# Patient Record
Sex: MALE | Race: WHITE | NOT HISPANIC OR LATINO | Employment: OTHER | URBAN - METROPOLITAN AREA
[De-identification: names, ages, dates, MRNs, and addresses within clinical notes are randomized per-mention and may not be internally consistent; named-entity substitution may affect disease eponyms.]

---

## 2020-12-14 ENCOUNTER — HOSPITAL ENCOUNTER (EMERGENCY)
Facility: HOSPITAL | Age: 70
Discharge: HOME | End: 2020-12-14
Attending: EMERGENCY MEDICINE
Payer: MEDICARE

## 2020-12-14 VITALS
HEIGHT: 63 IN | HEART RATE: 78 BPM | WEIGHT: 145 LBS | BODY MASS INDEX: 25.69 KG/M2 | SYSTOLIC BLOOD PRESSURE: 163 MMHG | TEMPERATURE: 99 F | DIASTOLIC BLOOD PRESSURE: 93 MMHG | RESPIRATION RATE: 16 BRPM | OXYGEN SATURATION: 98 %

## 2020-12-14 DIAGNOSIS — S76.012A STRAIN OF LEFT HIP, INITIAL ENCOUNTER: Primary | ICD-10-CM

## 2020-12-14 DIAGNOSIS — M25.552 LEFT HIP PAIN: ICD-10-CM

## 2020-12-14 PROCEDURE — 63600175 PHARM REV CODE 636 W HCPCS: Performed by: EMERGENCY MEDICINE

## 2020-12-14 PROCEDURE — 99284 EMERGENCY DEPT VISIT MOD MDM: CPT | Mod: 25

## 2020-12-14 PROCEDURE — 25000003 PHARM REV CODE 250: Performed by: EMERGENCY MEDICINE

## 2020-12-14 PROCEDURE — 96372 THER/PROPH/DIAG INJ SC/IM: CPT

## 2020-12-14 RX ORDER — OXYCODONE AND ACETAMINOPHEN 5; 325 MG/1; MG/1
1 TABLET ORAL EVERY 6 HOURS PRN
Qty: 12 TABLET | Refills: 0 | Status: SHIPPED | OUTPATIENT
Start: 2020-12-14 | End: 2020-12-17

## 2020-12-14 RX ORDER — OXYCODONE AND ACETAMINOPHEN 5; 325 MG/1; MG/1
1 TABLET ORAL
Status: COMPLETED | OUTPATIENT
Start: 2020-12-14 | End: 2020-12-14

## 2020-12-14 RX ORDER — KETOROLAC TROMETHAMINE 30 MG/ML
15 INJECTION, SOLUTION INTRAMUSCULAR; INTRAVENOUS
Status: COMPLETED | OUTPATIENT
Start: 2020-12-14 | End: 2020-12-14

## 2020-12-14 RX ADMIN — KETOROLAC TROMETHAMINE 15 MG: 30 INJECTION, SOLUTION INTRAMUSCULAR at 07:12

## 2020-12-14 RX ADMIN — OXYCODONE HYDROCHLORIDE AND ACETAMINOPHEN 1 TABLET: 5; 325 TABLET ORAL at 07:12

## 2020-12-15 NOTE — ED TRIAGE NOTES
Pt arrived to ER via POV w c/o L hip pain since this morning.Pt AAO X 4 states he has been riding the bicycle yesterday and was just fine until this morning.Pt denies falling or blunt trauma.

## 2020-12-15 NOTE — FIRST PROVIDER EVALUATION
Emergency Department TeleTriage Encounter Note      CHIEF COMPLAINT    Chief Complaint   Patient presents with    Hip Pain     Pt c/o left hip pain for about 1 day. Pt last time he felt normal when he was riding a bike and he woke up to pain of the left hip. Pt denies falling, or any other injuries.        VITAL SIGNS   Initial Vitals [12/14/20 1745]   BP Pulse Resp Temp SpO2   (!) 163/69 83 18 98.2 °F (36.8 °C) 97 %      MAP       --            ALLERGIES    Review of patient's allergies indicates:  No Known Allergies    PROVIDER TRIAGE NOTE  This is a teletriage evaluation of a 70 y.o. male presenting to the ED with c/o left hip pain today. He reports doing bicycle sprints recently, but denies injury or trauma to the hip. Initial orders will be placed and care will be transferred to an alternate provider when patient is roomed for a full evaluation. Any additional orders and the final disposition will be determined by that provider.         ORDERS  Labs Reviewed - No data to display    ED Orders (720h ago, onward)    Start Ordered     Status Ordering Provider    12/14/20 1821 12/14/20 1820  X-Ray Hip 2 View Left  1 time imaging      Ordered ABHILASH RUBIO            Virtual Visit Note: The provider triage portion of this emergency department evaluation and documentation was performed via [employer], a HIPAA-compliant telemedicine application, in concert with a tele-presenter in the room. A face to face patient evaluation with one of my colleagues will occur once the patient is placed in an emergency department room.      DISCLAIMER: This note was prepared with [employer] [place] voice recognition transcription software. Garbled syntax, mangled pronouns, and other bizarre constructions may be attributed to that software system.

## 2020-12-17 NOTE — ED PROVIDER NOTES
Encounter Date: 12/14/2020       History     Chief Complaint   Patient presents with    Hip Pain     Pt c/o left hip pain for about 1 day. Pt last time he felt normal when he was riding a bike and he woke up to pain of the left hip. Pt denies falling, or any other injuries.      70-year-old male with history of coronary disease on aspirin Plavix, hypertension, high cholesterol presents complaining of left hip pain.  Woke up with this pain yesterday.  States difficult to walk on.  The day before he was performing interval wind sprints on a bicycle.  Does not recall any specific trauma.  No fever.  No numbness or tingling or weakness to the leg.        Review of patient's allergies indicates:  No Known Allergies  Past Medical History:   Diagnosis Date    Acid reflux     Anticoagulant long-term use     PLAVIX and ASPIRIN    Coronary artery disease     had angiogram--pt refused STENT placement    Diverticula of intestine     Hematuria     Hypercholesteremia     Hypertension     Kidney stone      Past Surgical History:   Procedure Laterality Date    EXTRACORPOREAL SHOCK WAVE LITHOTRIPSY      x 4 episodes in past    EYE SURGERY      removal of kidney stones      with nephrostomy tube in place for awhile     No family history on file.  Social History     Tobacco Use    Smoking status: Current Every Day Smoker     Packs/day: 0.50     Years: 40.00     Pack years: 20.00    Smokeless tobacco: Never Used   Substance Use Topics    Alcohol use: No    Drug use: No     Review of Systems   Constitutional: Negative for fever.   HENT: Negative for sore throat.    Respiratory: Negative for shortness of breath.    Cardiovascular: Negative for chest pain.   Gastrointestinal: Negative for nausea.   Genitourinary: Negative for dysuria.   Musculoskeletal: Positive for arthralgias. Negative for back pain.   Skin: Negative for rash.   Neurological: Negative for weakness.   Hematological: Does not bruise/bleed easily.        Physical Exam     Initial Vitals [12/14/20 1745]   BP Pulse Resp Temp SpO2   (!) 163/69 83 18 98.2 °F (36.8 °C) 97 %      MAP       --         Physical Exam    Nursing note and vitals reviewed.  Constitutional: He appears well-developed and well-nourished.   HENT:   Head: Atraumatic.   Eyes: EOM are normal. Pupils are equal, round, and reactive to light.   Neck: Normal range of motion. Neck supple. No JVD present.   Cardiovascular: Normal rate, regular rhythm, normal heart sounds and intact distal pulses. Exam reveals no gallop and no friction rub.    No murmur heard.  Pulmonary/Chest: Breath sounds normal.   Abdominal: Soft. Bowel sounds are normal. There is no abdominal tenderness.   Musculoskeletal: Normal range of motion.   Lymphadenopathy:     He has no cervical adenopathy.   Neurological: He is alert and oriented to person, place, and time. He has normal strength.   Skin: Skin is warm and dry.   Psychiatric: He has a normal mood and affect. Thought content normal.         ED Course   Procedures  Labs Reviewed - No data to display       Imaging Results          X-Ray Hip 2 View Left (Final result)  Result time 12/14/20 19:23:04    Final result by Ros Worley MD (12/14/20 19:23:04)                 Impression:      No acute bony abnormality detected.  Degenerative changes.      Electronically signed by: Ros Worley  Date:    12/14/2020  Time:    19:23             Narrative:    EXAMINATION:  TWO VIEWS OF THE LEFT HIP    CLINICAL HISTORY:  Pain in left hip    TECHNIQUE:  AP and lateral view of the left hip    COMPARISON:  None.    FINDINGS:  Two views of the left hip demonstrate no acute fracture or dislocation.  Mild degenerative changes of both hips and the visualized lower lumbar spine are noted.  The bones are osteopenic.  If pain is out of proportion to the radiological findings, recommend CT or MRI for further delineation pleural                              Hip strain vs bursitis.                                   Clinical Impression:       ICD-10-CM ICD-9-CM   1. Strain of left hip, initial encounter  S76.012A 843.9   2. Left hip pain  M25.552 719.45                          ED Disposition Condition    Discharge Stable        ED Prescriptions     Medication Sig Dispense Start Date End Date Auth. Provider    oxyCODONE-acetaminophen (PERCOCET) 5-325 mg per tablet (Expires today) Take 1 tablet by mouth every 6 (six) hours as needed for Pain. 12 tablet 12/14/2020 12/17/2020 Dylon Castrejon MD        Follow-up Information     Follow up With Specialties Details Why Contact Info    Kobe Dougherty MD Orthopedic Surgery Schedule an appointment as soon as possible for a visit   [address]  [facility] [address]  [phone]      [facility]  As needed, If symptoms worsen, fever or any problems. [address] [address] [phone]                                       Dylon Castrejon MD  12/17/20 5196

## 2022-02-15 ENCOUNTER — OFFICE VISIT (OUTPATIENT)
Dept: FAMILY MEDICINE | Facility: CLINIC | Age: 72
End: 2022-02-15
Payer: MEDICARE

## 2022-02-15 VITALS
SYSTOLIC BLOOD PRESSURE: 129 MMHG | TEMPERATURE: 98 F | OXYGEN SATURATION: 97 % | BODY MASS INDEX: 24.8 KG/M2 | WEIGHT: 140 LBS | HEIGHT: 63 IN | RESPIRATION RATE: 16 BRPM | HEART RATE: 89 BPM | DIASTOLIC BLOOD PRESSURE: 75 MMHG

## 2022-02-15 DIAGNOSIS — I73.9 PVD (PERIPHERAL VASCULAR DISEASE): ICD-10-CM

## 2022-02-15 DIAGNOSIS — I10 HYPERTENSION, UNSPECIFIED TYPE: ICD-10-CM

## 2022-02-15 DIAGNOSIS — J44.9 CHRONIC OBSTRUCTIVE PULMONARY DISEASE, UNSPECIFIED COPD TYPE: ICD-10-CM

## 2022-02-15 DIAGNOSIS — E03.9 HYPOTHYROIDISM, UNSPECIFIED TYPE: ICD-10-CM

## 2022-02-15 DIAGNOSIS — N40.0 BPH WITHOUT URINARY OBSTRUCTION: ICD-10-CM

## 2022-02-15 DIAGNOSIS — I25.10 CORONARY ARTERY DISEASE, UNSPECIFIED VESSEL OR LESION TYPE, UNSPECIFIED WHETHER ANGINA PRESENT, UNSPECIFIED WHETHER NATIVE OR TRANSPLANTED HEART: ICD-10-CM

## 2022-02-15 DIAGNOSIS — C85.90 NON-HODGKIN'S LYMPHOMA, UNSPECIFIED BODY REGION, UNSPECIFIED NON-HODGKIN LYMPHOMA TYPE: Primary | ICD-10-CM

## 2022-02-15 PROCEDURE — 99204 PR OFFICE/OUTPT VISIT, NEW, LEVL IV, 45-59 MIN: ICD-10-PCS | Mod: S$PBB,,, | Performed by: FAMILY MEDICINE

## 2022-02-15 PROCEDURE — 99999 PR PBB SHADOW E&M-EST. PATIENT-LVL IV: ICD-10-PCS | Mod: PBBFAC,,, | Performed by: FAMILY MEDICINE

## 2022-02-15 PROCEDURE — 99999 PR PBB SHADOW E&M-EST. PATIENT-LVL IV: CPT | Mod: PBBFAC,,, | Performed by: FAMILY MEDICINE

## 2022-02-15 PROCEDURE — 99204 OFFICE O/P NEW MOD 45 MIN: CPT | Mod: S$PBB,,, | Performed by: FAMILY MEDICINE

## 2022-02-15 PROCEDURE — 99214 OFFICE O/P EST MOD 30 MIN: CPT | Mod: PBBFAC,PO | Performed by: FAMILY MEDICINE

## 2022-02-15 RX ORDER — FERROUS SULFATE 325(65) MG
250 TABLET ORAL
COMMUNITY
End: 2023-02-10

## 2022-02-15 RX ORDER — LANOLIN ALCOHOL/MO/W.PET/CERES
1 CREAM (GRAM) TOPICAL EVERY OTHER DAY
COMMUNITY
Start: 2021-06-02

## 2022-02-15 RX ORDER — LEVOTHYROXINE SODIUM 50 UG/1
50 TABLET ORAL
COMMUNITY
End: 2023-02-10

## 2022-02-15 NOTE — PROGRESS NOTES
Subjective:       Patient ID: Micheal Monroe is a 71 y.o. male.    Chief Complaint: Establish Care      HPI  71-year-old male presents to establish care.  Patient has a history of non-Hodgkin's lymphoma.  Was previously seen by Oncology but stopped going.  Was recommendation for chemo but patient declined.  Denies any symptoms at this time.  States he does not want to follow-up with Oncology.  States he has refills on his medications.      Review of Systems   Constitutional: Negative.    HENT: Negative.    Respiratory: Negative.    Cardiovascular: Negative.    Gastrointestinal: Negative.    Endocrine: Negative.    Genitourinary: Negative.    Musculoskeletal: Negative.    Neurological: Negative.    Psychiatric/Behavioral: Negative.           Past Medical History:   Diagnosis Date    Acid reflux     Anticoagulant long-term use     PLAVIX and ASPIRIN    Coronary artery disease     had angiogram--pt refused STENT placement    Diverticula of intestine     Hematuria     Hypercholesteremia     Hypertension     Kidney stone      Past Surgical History:   Procedure Laterality Date    EXTRACORPOREAL SHOCK WAVE LITHOTRIPSY      x 4 episodes in past    EYE SURGERY      removal of kidney stones      with nephrostomy tube in place for awhile     No family history on file.  Social History     Socioeconomic History    Marital status: Single   Tobacco Use    Smoking status: Current Every Day Smoker     Packs/day: 0.50     Years: 40.00     Pack years: 20.00    Smokeless tobacco: Never Used   Substance and Sexual Activity    Alcohol use: No    Drug use: No    Sexual activity: Yes       Current Outpatient Medications:     ascorbic acid, vitamin C, (VITAMIN C) 500 MG tablet, Take 500 mg by mouth once daily., Disp: , Rfl:     clopidogrel (PLAVIX) 75 mg tablet, Take 75 mg by mouth once. , Disp: , Rfl:     coenzyme Q10 100 mg capsule, Take 100 mg by mouth once daily. , Disp: , Rfl: 0    CRESTOR 20 mg tablet, Take 5 mg by  "mouth every evening., Disp: , Rfl:     cyanocobalamin (VITAMIN B-12) 1000 MCG tablet, Take 1 tablet by mouth every other day., Disp: , Rfl:     ferrous sulfate (FEOSOL) 325 mg (65 mg iron) Tab tablet, Take 250 mg by mouth., Disp: , Rfl:     fish oil-omega-3 fatty acids 300-1,000 mg capsule, Take 1 g by mouth 2 (two) times daily., Disp: , Rfl:     levothyroxine (SYNTHROID) 50 MCG tablet, Take 50 mcg by mouth before breakfast., Disp: , Rfl:     lisinopril 10 MG tablet, Take 10 mg by mouth every evening. , Disp: , Rfl:     ANUCORT-HC 25 mg suppository, Place 25 mg rectally daily as needed. , Disp: , Rfl:     aspirin 81 MG Chew, Take 81 mg by mouth every evening., Disp: , Rfl:     pantoprazole (PROTONIX) 40 MG tablet, Take 40 mg by mouth nightly. , Disp: , Rfl:     phenazopyridine (PYRIDIUM) 200 MG tablet, Take 1 tablet (200 mg total) by mouth 3 (three) times daily as needed for Pain (Burning). (Patient not taking: Reported on 2/15/2022), Disp: 21 tablet, Rfl: 0    tamsulosin (FLOMAX) 0.4 mg Cp24, Take 1 capsule (0.4 mg total) by mouth once daily. (Patient taking differently: Take 0.4 mg by mouth nightly. ), Disp: 30 capsule, Rfl: 11   Objective:      Vitals:    02/15/22 1437   BP: 129/75   BP Location: Left arm   Patient Position: Sitting   BP Method: Small (Manual)   Pulse: 89   Resp: 16   Temp: 98.2 °F (36.8 °C)   TempSrc: Oral   SpO2: 97%   Weight: 63.5 kg (140 lb)   Height: 5' 3" (1.6 m)       Physical Exam  Constitutional:       General: He is not in acute distress.  HENT:      Head: Normocephalic and atraumatic.   Eyes:      Conjunctiva/sclera: Conjunctivae normal.   Cardiovascular:      Rate and Rhythm: Normal rate and regular rhythm.      Heart sounds: Normal heart sounds. No murmur heard.    No friction rub. No gallop.   Pulmonary:      Effort: Pulmonary effort is normal.      Breath sounds: Normal breath sounds. No wheezing or rales.   Musculoskeletal:      Cervical back: Neck supple.   Skin:     " General: Skin is warm and dry.   Neurological:      Mental Status: He is alert and oriented to person, place, and time.   Psychiatric:         Behavior: Behavior normal.         Thought Content: Thought content normal.         Judgment: Judgment normal.            Assessment:       1. Non-Hodgkin's lymphoma, unspecified body region, unspecified non-Hodgkin lymphoma type    2. BPH without urinary obstruction    3. PVD (peripheral vascular disease)    4. Hypertension, unspecified type    5. Chronic obstructive pulmonary disease, unspecified COPD type    6. Coronary artery disease, unspecified vessel or lesion type, unspecified whether angina present, unspecified whether native or transplanted heart    7. Hypothyroidism, unspecified type        Plan:       Non-Hodgkin's lymphoma, unspecified body region, unspecified non-Hodgkin lymphoma type    BPH without urinary obstruction    PVD (peripheral vascular disease)    Hypertension, unspecified type    Chronic obstructive pulmonary disease, unspecified COPD type    Coronary artery disease, unspecified vessel or lesion type, unspecified whether angina present, unspecified whether native or transplanted heart    Hypothyroidism, unspecified type  -     TSH; Future; Expected date: 02/15/2022  -     T4, Free; Future; Expected date: 02/15/2022      Continue meds.  Patient again declined follow-up with Oncology for his lymphoma.  Patient strongly advise to follow up with Oncology. Declined palliative care as well patient [facility]  patient had elevated TSH previously will recheck.          Patient note was created using [employer].  Any errors in syntax or even information may not have been identified and edited on initial review prior to signing this note.

## 2022-03-07 ENCOUNTER — TELEPHONE (OUTPATIENT)
Dept: FAMILY MEDICINE | Facility: CLINIC | Age: 72
End: 2022-03-07
Payer: MEDICARE

## 2022-03-07 NOTE — TELEPHONE ENCOUNTER
----- Message from Gaurav Perez sent at 3/7/2022  3:53 PM CST -----  Regarding: Call Back  Who Called: pt         What is the reason for the call: patient is calling in regards to tingling and tightness in left arm and states want referral to see a heart doctor. Please contact to further assist.          Can patient be contacted on [employer]: No          Call back number: [phone]    
Pt states he is having tingling and tightness and heart and arm x 1 week.  Recommended going to ER. Pt states he will go to urgent care now.   
None needed

## 2022-03-24 ENCOUNTER — OFFICE VISIT (OUTPATIENT)
Dept: CARDIOLOGY | Facility: CLINIC | Age: 72
End: 2022-03-24
Payer: MEDICARE

## 2022-03-24 VITALS
DIASTOLIC BLOOD PRESSURE: 73 MMHG | OXYGEN SATURATION: 96 % | HEIGHT: 63 IN | WEIGHT: 142.19 LBS | SYSTOLIC BLOOD PRESSURE: 128 MMHG | BODY MASS INDEX: 25.2 KG/M2 | RESPIRATION RATE: 16 BRPM | HEART RATE: 90 BPM

## 2022-03-24 DIAGNOSIS — I25.119 CORONARY ARTERY DISEASE INVOLVING NATIVE CORONARY ARTERY OF NATIVE HEART WITH ANGINA PECTORIS: ICD-10-CM

## 2022-03-24 DIAGNOSIS — R07.89 CHEST PAIN, ATYPICAL: ICD-10-CM

## 2022-03-24 DIAGNOSIS — I10 PRIMARY HYPERTENSION: ICD-10-CM

## 2022-03-24 DIAGNOSIS — R06.09 DOE (DYSPNEA ON EXERTION): ICD-10-CM

## 2022-03-24 DIAGNOSIS — E78.00 PURE HYPERCHOLESTEROLEMIA: ICD-10-CM

## 2022-03-24 PROCEDURE — 93000 ELECTROCARDIOGRAM COMPLETE: CPT | Mod: S$GLB,,, | Performed by: INTERNAL MEDICINE

## 2022-03-24 PROCEDURE — 3008F BODY MASS INDEX DOCD: CPT | Mod: CPTII,S$GLB,, | Performed by: INTERNAL MEDICINE

## 2022-03-24 PROCEDURE — 93000 EKG 12-LEAD: ICD-10-PCS | Mod: S$GLB,,, | Performed by: INTERNAL MEDICINE

## 2022-03-24 PROCEDURE — 1159F PR MEDICATION LIST DOCUMENTED IN MEDICAL RECORD: ICD-10-PCS | Mod: CPTII,S$GLB,, | Performed by: INTERNAL MEDICINE

## 2022-03-24 PROCEDURE — 3008F PR BODY MASS INDEX (BMI) DOCUMENTED: ICD-10-PCS | Mod: CPTII,S$GLB,, | Performed by: INTERNAL MEDICINE

## 2022-03-24 PROCEDURE — 3288F FALL RISK ASSESSMENT DOCD: CPT | Mod: CPTII,S$GLB,, | Performed by: INTERNAL MEDICINE

## 2022-03-24 PROCEDURE — 1125F AMNT PAIN NOTED PAIN PRSNT: CPT | Mod: CPTII,S$GLB,, | Performed by: INTERNAL MEDICINE

## 2022-03-24 PROCEDURE — 1159F MED LIST DOCD IN RCRD: CPT | Mod: CPTII,S$GLB,, | Performed by: INTERNAL MEDICINE

## 2022-03-24 PROCEDURE — 99999 PR PBB SHADOW E&M-EST. PATIENT-LVL IV: ICD-10-PCS | Mod: PBBFAC,,, | Performed by: INTERNAL MEDICINE

## 2022-03-24 PROCEDURE — 99204 OFFICE O/P NEW MOD 45 MIN: CPT | Mod: S$GLB,,, | Performed by: INTERNAL MEDICINE

## 2022-03-24 PROCEDURE — 1101F PT FALLS ASSESS-DOCD LE1/YR: CPT | Mod: CPTII,S$GLB,, | Performed by: INTERNAL MEDICINE

## 2022-03-24 PROCEDURE — 3074F SYST BP LT 130 MM HG: CPT | Mod: CPTII,S$GLB,, | Performed by: INTERNAL MEDICINE

## 2022-03-24 PROCEDURE — 99999 PR PBB SHADOW E&M-EST. PATIENT-LVL IV: CPT | Mod: PBBFAC,,, | Performed by: INTERNAL MEDICINE

## 2022-03-24 PROCEDURE — 3078F DIAST BP <80 MM HG: CPT | Mod: CPTII,S$GLB,, | Performed by: INTERNAL MEDICINE

## 2022-03-24 PROCEDURE — 1125F PR PAIN SEVERITY QUANTIFIED, PAIN PRESENT: ICD-10-PCS | Mod: CPTII,S$GLB,, | Performed by: INTERNAL MEDICINE

## 2022-03-24 PROCEDURE — 3288F PR FALLS RISK ASSESSMENT DOCUMENTED: ICD-10-PCS | Mod: CPTII,S$GLB,, | Performed by: INTERNAL MEDICINE

## 2022-03-24 PROCEDURE — 1101F PR PT FALLS ASSESS DOC 0-1 FALLS W/OUT INJ PAST YR: ICD-10-PCS | Mod: CPTII,S$GLB,, | Performed by: INTERNAL MEDICINE

## 2022-03-24 PROCEDURE — 99204 PR OFFICE/OUTPT VISIT, NEW, LEVL IV, 45-59 MIN: ICD-10-PCS | Mod: S$GLB,,, | Performed by: INTERNAL MEDICINE

## 2022-03-24 PROCEDURE — 3074F PR MOST RECENT SYSTOLIC BLOOD PRESSURE < 130 MM HG: ICD-10-PCS | Mod: CPTII,S$GLB,, | Performed by: INTERNAL MEDICINE

## 2022-03-24 PROCEDURE — 3078F PR MOST RECENT DIASTOLIC BLOOD PRESSURE < 80 MM HG: ICD-10-PCS | Mod: CPTII,S$GLB,, | Performed by: INTERNAL MEDICINE

## 2022-03-24 NOTE — PROGRESS NOTES
Subjective:    Patient ID:  Micheal Monroe is a 71 y.o. male who presents for evaluation of No chief complaint on file.      HPI     [facility]AN [facility]    Essential hypertension  Comments:  Controlled on current regimen.    Hyperlipidemia, unspecified hyperlipidemia type  Comments:  Stable. Monitor lipid panel.    Chronic obstructive pulmonary disease, unspecified COPD type  Comments:  Continue treatment as per Pulmonary.    Coronary artery disease due to lipid rich plaque  Comments:  Stable. Follow up with Cardiology    Non-Hodgkin's lymphoma, unspecified body region, unspecified non-Hodgkin lymphoma type  Comments:  [facility]       3/24/22 Here for evaluation of CAD - reports remote [facility] years ago - was told he needed a stent but he declined. Reports some increased BROOKS. Last week developed severe chest tightness radiating to left arm while replacing a radiator in a car  Smokes 1/2 ppd. Gets hip pain from cancer met - per patient  EKG NSR - ok      Review of Systems   Constitutional: Negative for decreased appetite.   HENT: Negative for ear discharge.    Eyes: Negative for blurred vision.   Endocrine: Negative for polyphagia.   Skin: Negative for nail changes.   Genitourinary: Negative for bladder incontinence.   Neurological: Negative for aphonia.   Psychiatric/Behavioral: Negative for hallucinations.   Allergic/Immunologic: Negative for hives.        Objective:    Physical Exam  Constitutional:       Appearance: He is well-developed.   HENT:      Head: Normocephalic and atraumatic.   Eyes:      Conjunctiva/sclera: Conjunctivae normal.      Pupils: Pupils are equal, round, and reactive to light.   Cardiovascular:      Rate and Rhythm: Normal rate.      Pulses: Intact distal pulses.      Heart sounds: Normal heart sounds.   Pulmonary:      Effort: Pulmonary effort is normal.      Breath sounds: Normal breath sounds.   Abdominal:      General: Bowel sounds are  normal.      Palpations: Abdomen is soft.   Musculoskeletal:         General: Normal range of motion.      Cervical back: Normal range of motion and neck supple.   Skin:     General: Skin is warm and dry.   Neurological:      Mental Status: He is alert and oriented to person, place, and time.           Assessment:       1. Coronary artery disease involving native coronary artery of native heart with angina pectoris    2. Primary hypertension    3. Pure hypercholesterolemia    4. Chest pain, atypical    5. BROOKS (dyspnea on exertion)         Plan:       Echo and lexiscan myoview for CP and BROOKS - cannot walk treadmill due to hip issues  Continue Rx for HTN, HLD, CAD

## 2022-04-12 ENCOUNTER — HOSPITAL ENCOUNTER (OUTPATIENT)
Dept: CARDIOLOGY | Facility: HOSPITAL | Age: 72
Discharge: HOME | End: 2022-04-12
Attending: INTERNAL MEDICINE
Payer: MEDICARE

## 2022-04-12 ENCOUNTER — HOSPITAL ENCOUNTER (OUTPATIENT)
Dept: RADIOLOGY | Facility: HOSPITAL | Age: 72
Discharge: HOME | End: 2022-04-12
Attending: INTERNAL MEDICINE
Payer: MEDICARE

## 2022-04-12 DIAGNOSIS — I10 PRIMARY HYPERTENSION: ICD-10-CM

## 2022-04-12 DIAGNOSIS — R07.89 CHEST PAIN, ATYPICAL: ICD-10-CM

## 2022-04-12 DIAGNOSIS — R06.09 DOE (DYSPNEA ON EXERTION): ICD-10-CM

## 2022-04-12 DIAGNOSIS — I25.119 CORONARY ARTERY DISEASE INVOLVING NATIVE CORONARY ARTERY OF NATIVE HEART WITH ANGINA PECTORIS: ICD-10-CM

## 2022-04-12 DIAGNOSIS — E78.00 PURE HYPERCHOLESTEROLEMIA: ICD-10-CM

## 2022-04-12 LAB
AORTIC ROOT ANNULUS: 2.81 CM
AORTIC VALVE CUSP SEPERATION: 2.12 CM
AV INDEX (PROSTH): 0.97
AV MEAN GRADIENT: 3 MMHG
AV PEAK GRADIENT: 6 MMHG
AV VALVE AREA: 2.97 CM2
AV VELOCITY RATIO: 0.99
CV ECHO LV RWT: 0.7 CM
CV STRESS BASE HR: 64 BPM
DIASTOLIC BLOOD PRESSURE: 76 MMHG
DOP CALC AO PEAK VEL: 1.25 M/S
DOP CALC AO VTI: 25.64 CM
DOP CALC LVOT AREA: 3 CM2
DOP CALC LVOT DIAMETER: 1.97 CM
DOP CALC LVOT PEAK VEL: 1.24 M/S
DOP CALC LVOT STROKE VOLUME: 76.04 CM3
DOP CALCLVOT PEAK VEL VTI: 24.96 CM
E WAVE DECELERATION TIME: 229.86 MSEC
E/A RATIO: 0.98
E/E' RATIO: 10.12 M/S
ECHO LV POSTERIOR WALL: 1.18 CM (ref 0.6–1.1)
EJECTION FRACTION: 55 %
FRACTIONAL SHORTENING: 33 % (ref 28–44)
INTERVENTRICULAR SEPTUM: 1.12 CM (ref 0.6–1.1)
LA MAJOR: 3.38 CM
LA MINOR: 4.19 CM
LA WIDTH: 3.77 CM
LEFT ATRIUM SIZE: 3.03 CM
LEFT ATRIUM VOLUME: 36.33 CM3
LEFT INTERNAL DIMENSION IN SYSTOLE: 2.26 CM (ref 2.1–4)
LEFT VENTRICLE DIASTOLIC VOLUME: 46.37 ML
LEFT VENTRICLE SYSTOLIC VOLUME: 17.41 ML
LEFT VENTRICULAR INTERNAL DIMENSION IN DIASTOLE: 3.37 CM (ref 3.5–6)
LEFT VENTRICULAR MASS: 120.42 G
LV LATERAL E/E' RATIO: 8.6 M/S
LV SEPTAL E/E' RATIO: 12.29 M/S
MV A" WAVE DURATION": 7.27 MSEC
MV PEAK A VEL: 0.88 M/S
MV PEAK E VEL: 0.86 M/S
MV STENOSIS PRESSURE HALF TIME: 53.11 MS
MV VALVE AREA P 1/2 METHOD: 4.14 CM2
NUC STRESS DIASTOLIC VOLUME INDEX: 28
NUC STRESS EJECTION FRACTION: 78 %
NUC STRESS SYSTOLIC VOLUME INDEX: 6
OHS CV CPX 85 PERCENT MAX PREDICTED HEART RATE MALE: 127
OHS CV CPX MAX PREDICTED HEART RATE: 149
OHS CV CPX PATIENT IS FEMALE: 0
OHS CV CPX PATIENT IS MALE: 1
OHS CV CPX PEAK DIASTOLIC BLOOD PRESSURE: 66 MMHG
OHS CV CPX PEAK HEAR RATE: 88 BPM
OHS CV CPX PEAK RATE PRESSURE PRODUCT: NORMAL
OHS CV CPX PEAK SYSTOLIC BLOOD PRESSURE: 122 MMHG
OHS CV CPX PERCENT MAX PREDICTED HEART RATE ACHIEVED: 59
OHS CV CPX RATE PRESSURE PRODUCT PRESENTING: 9152
PISA MRMAX VEL: 0.03 M/S
PISA TR MAX VEL: 1.85 M/S
PULM VEIN S/D RATIO: 2
PV PEAK D VEL: 0.26 M/S
PV PEAK S VEL: 0.52 M/S
PV PEAK VELOCITY: 0.92 CM/S
RA MAJOR: 4.36 CM
RA PRESSURE: 3 MMHG
RA WIDTH: 3.17 CM
RIGHT VENTRICULAR END-DIASTOLIC DIMENSION: 3.25 CM
SINUS: 3.23 CM
SYSTOLIC BLOOD PRESSURE: 143 MMHG
TDI LATERAL: 0.1 M/S
TDI SEPTAL: 0.07 M/S
TDI: 0.09 M/S
TR MAX PG: 14 MMHG
TRICUSPID ANNULAR PLANE SYSTOLIC EXCURSION: 2.11 CM
TV REST PULMONARY ARTERY PRESSURE: 17 MMHG

## 2022-04-12 PROCEDURE — 93016 CV STRESS TEST SUPVJ ONLY: CPT | Mod: ,,, | Performed by: INTERNAL MEDICINE

## 2022-04-12 PROCEDURE — 93018 CV STRESS TEST I&R ONLY: CPT | Mod: ,,, | Performed by: INTERNAL MEDICINE

## 2022-04-12 PROCEDURE — 93016 STRESS TEST WITH MYOCARDIAL PERFUSION (CUPID ONLY): ICD-10-PCS | Mod: ,,, | Performed by: INTERNAL MEDICINE

## 2022-04-12 PROCEDURE — 93306 TTE W/DOPPLER COMPLETE: CPT

## 2022-04-12 PROCEDURE — 93306 ECHO (CUPID ONLY): ICD-10-PCS | Mod: 26,,, | Performed by: INTERNAL MEDICINE

## 2022-04-12 PROCEDURE — A9502 TC99M TETROFOSMIN: HCPCS

## 2022-04-12 PROCEDURE — 63600175 PHARM REV CODE 636 W HCPCS: Performed by: INTERNAL MEDICINE

## 2022-04-12 PROCEDURE — 93018 STRESS TEST WITH MYOCARDIAL PERFUSION (CUPID ONLY): ICD-10-PCS | Mod: ,,, | Performed by: INTERNAL MEDICINE

## 2022-04-12 PROCEDURE — 78452 STRESS TEST WITH MYOCARDIAL PERFUSION (CUPID ONLY): ICD-10-PCS | Mod: 26,,, | Performed by: INTERNAL MEDICINE

## 2022-04-12 PROCEDURE — 93017 CV STRESS TEST TRACING ONLY: CPT

## 2022-04-12 PROCEDURE — 93306 TTE W/DOPPLER COMPLETE: CPT | Mod: 26,,, | Performed by: INTERNAL MEDICINE

## 2022-04-12 PROCEDURE — 78452 HT MUSCLE IMAGE SPECT MULT: CPT | Mod: 26,,, | Performed by: INTERNAL MEDICINE

## 2022-04-12 RX ORDER — REGADENOSON 0.08 MG/ML
0.4 INJECTION, SOLUTION INTRAVENOUS ONCE
Status: COMPLETED | OUTPATIENT
Start: 2022-04-12 | End: 2022-04-12

## 2022-04-12 RX ADMIN — REGADENOSON 0.4 MG: 0.08 INJECTION, SOLUTION INTRAVENOUS at 09:04

## 2022-05-20 ENCOUNTER — TELEPHONE (OUTPATIENT)
Dept: FAMILY MEDICINE | Facility: CLINIC | Age: 72
End: 2022-05-20
Payer: MEDICARE

## 2022-05-20 NOTE — TELEPHONE ENCOUNTER
----- Message from Ghada Diaz sent at 5/20/2022 12:44 PM CDT -----  Regarding: self [phone]  Type: Patient Call Back    Who called: self    What is the request in detail: Patient needs to have hip surgery and will come drop off paper work and asked for a call back.   Can the clinic reply by [facility]? no    Would the patient rather a call back or a response via [facility]?  Call back    Best call back number:[phone]

## 2022-05-24 ENCOUNTER — TELEPHONE (OUTPATIENT)
Dept: CARDIOLOGY | Facility: CLINIC | Age: 72
End: 2022-05-24
Payer: MEDICARE

## 2022-05-24 NOTE — TELEPHONE ENCOUNTER
Pt requests I call [facility] to get them to fax over a surgery clearance form. I reported that I will do so.     I also scheduled pt for an appt with Dr. Yeung to discuss the results of his test from last month as per his request.    ----- Message from Amelia Rosado sent at 5/23/2022 10:52 AM CDT -----  Type: Patient Call Back    Who called: self     What is the request in detail: Patient would like to speak with a nurse regarding clearance paperwork he needs signed for hip surgery, and to discuss his recent test results. Unable to schedule anything at this time.      Can the clinic reply by [facility]? No     Would the patient rather a call back or a response via [facility]? Call back     Best call back number: [phone]

## 2022-05-30 ENCOUNTER — OFFICE VISIT (OUTPATIENT)
Dept: FAMILY MEDICINE | Facility: CLINIC | Age: 72
End: 2022-05-30
Payer: MEDICARE

## 2022-05-30 ENCOUNTER — LAB VISIT (OUTPATIENT)
Dept: LAB | Facility: HOSPITAL | Age: 72
End: 2022-05-30
Attending: NURSE PRACTITIONER
Payer: MEDICARE

## 2022-05-30 VITALS
BODY MASS INDEX: 24.76 KG/M2 | HEART RATE: 108 BPM | DIASTOLIC BLOOD PRESSURE: 76 MMHG | WEIGHT: 139.75 LBS | RESPIRATION RATE: 18 BRPM | SYSTOLIC BLOOD PRESSURE: 128 MMHG | HEIGHT: 63 IN | TEMPERATURE: 99 F | OXYGEN SATURATION: 95 %

## 2022-05-30 DIAGNOSIS — I25.119 CORONARY ARTERY DISEASE INVOLVING NATIVE CORONARY ARTERY OF NATIVE HEART WITH ANGINA PECTORIS: ICD-10-CM

## 2022-05-30 DIAGNOSIS — R40.2410 GLASGOW COMA SCALE TOTAL SCORE 13-15, UNSPECIFIED COMA TIMING: ICD-10-CM

## 2022-05-30 DIAGNOSIS — Z01.818 PRE-OP EVALUATION: Primary | ICD-10-CM

## 2022-05-30 DIAGNOSIS — Z01.818 PRE-OP EVALUATION: ICD-10-CM

## 2022-05-30 DIAGNOSIS — C85.90 LYMPHOMA, UNSPECIFIED BODY REGION, UNSPECIFIED LYMPHOMA TYPE: ICD-10-CM

## 2022-05-30 DIAGNOSIS — E03.9 HYPOTHYROIDISM, UNSPECIFIED TYPE: ICD-10-CM

## 2022-05-30 LAB
BILIRUB UR QL STRIP: NEGATIVE
CLARITY UR: CLEAR
COLOR UR: YELLOW
GLUCOSE UR QL STRIP: NEGATIVE
HGB UR QL STRIP: NEGATIVE
KETONES UR QL STRIP: NEGATIVE
LEUKOCYTE ESTERASE UR QL STRIP: NEGATIVE
NITRITE UR QL STRIP: NEGATIVE
PH UR STRIP: 5 [PH] (ref 5–8)
PROT UR QL STRIP: ABNORMAL
SP GR UR STRIP: 1.02 (ref 1–1.03)
URN SPEC COLLECT METH UR: ABNORMAL
UROBILINOGEN UR STRIP-ACNC: NEGATIVE EU/DL

## 2022-05-30 PROCEDURE — 1125F AMNT PAIN NOTED PAIN PRSNT: CPT | Mod: CPTII,S$GLB,, | Performed by: NURSE PRACTITIONER

## 2022-05-30 PROCEDURE — 3288F FALL RISK ASSESSMENT DOCD: CPT | Mod: CPTII,S$GLB,, | Performed by: NURSE PRACTITIONER

## 2022-05-30 PROCEDURE — 3008F PR BODY MASS INDEX (BMI) DOCUMENTED: ICD-10-PCS | Mod: CPTII,S$GLB,, | Performed by: NURSE PRACTITIONER

## 2022-05-30 PROCEDURE — 99999 PR PBB SHADOW E&M-EST. PATIENT-LVL V: CPT | Mod: PBBFAC,,, | Performed by: NURSE PRACTITIONER

## 2022-05-30 PROCEDURE — 99214 PR OFFICE/OUTPT VISIT, EST, LEVL IV, 30-39 MIN: ICD-10-PCS | Mod: S$GLB,,, | Performed by: NURSE PRACTITIONER

## 2022-05-30 PROCEDURE — 1159F MED LIST DOCD IN RCRD: CPT | Mod: CPTII,S$GLB,, | Performed by: NURSE PRACTITIONER

## 2022-05-30 PROCEDURE — 3074F SYST BP LT 130 MM HG: CPT | Mod: CPTII,S$GLB,, | Performed by: NURSE PRACTITIONER

## 2022-05-30 PROCEDURE — 3288F PR FALLS RISK ASSESSMENT DOCUMENTED: ICD-10-PCS | Mod: CPTII,S$GLB,, | Performed by: NURSE PRACTITIONER

## 2022-05-30 PROCEDURE — 3078F DIAST BP <80 MM HG: CPT | Mod: CPTII,S$GLB,, | Performed by: NURSE PRACTITIONER

## 2022-05-30 PROCEDURE — 1160F PR REVIEW ALL MEDS BY PRESCRIBER/CLIN PHARMACIST DOCUMENTED: ICD-10-PCS | Mod: CPTII,S$GLB,, | Performed by: NURSE PRACTITIONER

## 2022-05-30 PROCEDURE — 3074F PR MOST RECENT SYSTOLIC BLOOD PRESSURE < 130 MM HG: ICD-10-PCS | Mod: CPTII,S$GLB,, | Performed by: NURSE PRACTITIONER

## 2022-05-30 PROCEDURE — 1160F RVW MEDS BY RX/DR IN RCRD: CPT | Mod: CPTII,S$GLB,, | Performed by: NURSE PRACTITIONER

## 2022-05-30 PROCEDURE — 1125F PR PAIN SEVERITY QUANTIFIED, PAIN PRESENT: ICD-10-PCS | Mod: CPTII,S$GLB,, | Performed by: NURSE PRACTITIONER

## 2022-05-30 PROCEDURE — 1101F PR PT FALLS ASSESS DOC 0-1 FALLS W/OUT INJ PAST YR: ICD-10-PCS | Mod: CPTII,S$GLB,, | Performed by: NURSE PRACTITIONER

## 2022-05-30 PROCEDURE — 1101F PT FALLS ASSESS-DOCD LE1/YR: CPT | Mod: CPTII,S$GLB,, | Performed by: NURSE PRACTITIONER

## 2022-05-30 PROCEDURE — 81003 URINALYSIS AUTO W/O SCOPE: CPT | Performed by: NURSE PRACTITIONER

## 2022-05-30 PROCEDURE — 1159F PR MEDICATION LIST DOCUMENTED IN MEDICAL RECORD: ICD-10-PCS | Mod: CPTII,S$GLB,, | Performed by: NURSE PRACTITIONER

## 2022-05-30 PROCEDURE — 99214 OFFICE O/P EST MOD 30 MIN: CPT | Mod: S$GLB,,, | Performed by: NURSE PRACTITIONER

## 2022-05-30 PROCEDURE — 3008F BODY MASS INDEX DOCD: CPT | Mod: CPTII,S$GLB,, | Performed by: NURSE PRACTITIONER

## 2022-05-30 PROCEDURE — 3078F PR MOST RECENT DIASTOLIC BLOOD PRESSURE < 80 MM HG: ICD-10-PCS | Mod: CPTII,S$GLB,, | Performed by: NURSE PRACTITIONER

## 2022-05-30 PROCEDURE — 99999 PR PBB SHADOW E&M-EST. PATIENT-LVL V: ICD-10-PCS | Mod: PBBFAC,,, | Performed by: NURSE PRACTITIONER

## 2022-05-30 NOTE — PROGRESS NOTES
Routine Office Visit    Patient Name: Micheal Monroe    : 1950  MRN: [id number]    Chief Complaint:  Preop clearance    Subjective:  Micheal is a 71 y.o. male who presents today for:    1. Preop clearance - patient who is new to me with a history of CAD, hypothyroidism, lymphoma, COPD reports today for evaluation.  He needs clearance for a total left hip replacement under general anesthesia with Dr. Fercho Holloway.  He does not have a surgery date set yet.    He does smoke almost a pack a day.  He denies any chest pain or shortness of breath.  He does follow with Cardiology.  He does take clopidogrel every day.  He takes levothyroxine daily on an empty stomach at least an hour before his other medications.    He denies any problems with anesthesia in the past.    As for his lymphoma, he states he has not seen an oncologist in over a year and has refused chemotherapy in the past.  He states he was interested in immunotherapy but was unable to be [facility] with [facility] [place] the [place].    He has no new or acute problems or concerns.    Past Medical History  Past Medical History:   Diagnosis Date    Acid reflux     Anticoagulant long-term use     PLAVIX and ASPIRIN    Coronary artery disease     had angiogram--pt refused STENT placement    Diverticula of intestine     Hematuria     Hypercholesteremia     Hypertension     Kidney stone        Past Surgical History  Past Surgical History:   Procedure Laterality Date    EXTRACORPOREAL SHOCK WAVE LITHOTRIPSY      x 4 episodes in past    EYE SURGERY      removal of kidney stones      with nephrostomy tube in place for awhile       Family History  History reviewed. No pertinent family history.    Social History  Social History     Socioeconomic History    Marital status: Single   Tobacco Use    Smoking status: Current Every Day Smoker     Packs/day: 0.50     Years: 40.00     Pack years: 20.00    Smokeless tobacco: Never Used   Substance and Sexual  Activity    Alcohol use: No    Drug use: No    Sexual activity: Yes       Current Medications  Current Outpatient Medications on File Prior to Visit   Medication Sig Dispense Refill    ANUCORT-HC 25 mg suppository Place 25 mg rectally daily as needed.       ascorbic acid, vitamin C, (VITAMIN C) 500 MG tablet Take 500 mg by mouth once daily.      aspirin 81 MG Chew Take 81 mg by mouth every evening.      clopidogrel (PLAVIX) 75 mg tablet Take 75 mg by mouth once.       coenzyme Q10 100 mg capsule Take 100 mg by mouth once daily.   0    CRESTOR 20 mg tablet Take 5 mg by mouth every evening.      cyanocobalamin (VITAMIN B-12) 1000 MCG tablet Take 1 tablet by mouth every other day.      ferrous sulfate (FEOSOL) 325 mg (65 mg iron) Tab tablet Take 250 mg by mouth.      fish oil-omega-3 fatty acids 300-1,000 mg capsule Take 1 g by mouth 2 (two) times daily.      levothyroxine (SYNTHROID) 50 MCG tablet Take 50 mcg by mouth before breakfast.      lisinopril 10 MG tablet Take 10 mg by mouth every evening.       pantoprazole (PROTONIX) 40 MG tablet Take 40 mg by mouth nightly.       phenazopyridine (PYRIDIUM) 200 MG tablet Take 1 tablet (200 mg total) by mouth 3 (three) times daily as needed for Pain (Burning). (Patient not taking: Reported on 5/30/2022) 21 tablet 0    tamsulosin (FLOMAX) 0.4 mg Cp24 Take 1 capsule (0.4 mg total) by mouth once daily. (Patient taking differently: No sig reported) 30 capsule 11     No current facility-administered medications on file prior to visit.       Allergies   Review of patient's allergies indicates:   Allergen Reactions    Ciprofloxacin Other (See Comments)       Review of Systems (Pertinent positives)  Review of Systems   Constitutional: Negative.  Negative for chills, diaphoresis, fever, malaise/fatigue and weight loss.   HENT: Negative.    Eyes: Negative.    Respiratory: Negative for cough, hemoptysis, sputum production, shortness of breath and wheezing.   "  Cardiovascular: Negative for chest pain, palpitations, orthopnea, claudication, leg swelling and PND.   Gastrointestinal: Negative.    Genitourinary: Negative.    Musculoskeletal: Negative.    Skin: Negative.    Neurological: Negative.    Endo/Heme/Allergies: Negative.    Psychiatric/Behavioral: Negative.        /76 (BP Location: Left arm, Patient Position: Sitting, BP Method: Medium (Manual))   Pulse 108   Temp 99.3 °F (37.4 °C) (Oral)   Resp 18   Ht 5' 3" (1.6 m)   Wt 63.4 kg (139 lb 12.4 oz)   SpO2 95%   BMI 24.76 kg/m²     Physical Exam  Vitals reviewed.   Constitutional:       General: He is not in acute distress.     Appearance: Normal appearance. He is not ill-appearing, toxic-appearing or diaphoretic.   HENT:      Head: Normocephalic and atraumatic.   Cardiovascular:      Rate and Rhythm: Normal rate and regular rhythm.      Pulses: Normal pulses.      Heart sounds: Normal heart sounds.   Pulmonary:      Effort: Pulmonary effort is normal.      Breath sounds: Normal breath sounds.   Abdominal:      General: Bowel sounds are normal. There is no distension.      Palpations: Abdomen is soft. There is no mass.      Tenderness: There is no abdominal tenderness.   Skin:     General: Skin is warm and dry.   Neurological:      Mental Status: He is alert and oriented to person, place, and time.      GCS: GCS eye subscore is 4. GCS verbal subscore is 5. GCS motor subscore is 6.   Psychiatric:         Mood and Affect: Mood normal.         Behavior: Behavior normal.          Assessment/Plan:  Micheal Monroe is a 71 y.o. male who presents today for :    Diagnoses and all orders for this visit:    Pre-op evaluation  -     APTT; Future  -     Urinalysis; Future  -     X-Ray Chest PA And Lateral; Future    Lymphoma, unspecified body region, unspecified lymphoma type  -     CBC W/ AUTO DIFFERENTIAL; Future  -     COMPREHENSIVE METABOLIC PANEL; Future  -     PROTIME-INR; Future  -     APTT; Future    Coronary " artery disease involving native coronary artery of native heart with angina pectoris    Hypothyroidism, unspecified type  -     TSH; Future    Natalia coma scale total score 13-15, unspecified coma timing   -     APTT; Future    - labs ordered as requested by surgeon  - will check chest x-ray and urinalysis as well, as requested  - patient will need clearance from Cardiology  - will follow up with results  - discussed with patient that given his smoking risk and untreated cancer patient will be at higher cardiovascular risk  - will determine clearance based on results        This office note has been dictated.  This dictation has been generated using [employer]Modal [employer] Direct dictation; some phonetic errors may occur.   My collaborating physician is Dr. Candido Becerril.

## 2022-06-10 ENCOUNTER — TELEPHONE (OUTPATIENT)
Dept: FAMILY MEDICINE | Facility: CLINIC | Age: 72
End: 2022-06-10
Payer: MEDICARE

## 2022-06-10 NOTE — TELEPHONE ENCOUNTER
LVM for patient to contact office.   ----- Message from Alex Gamez NP sent at 6/10/2022  4:03 PM CDT -----  Please call patient about his labs.  Overall they look okay.  He will need to see Cardiology prior to his procedure.  Thank you

## 2022-06-14 ENCOUNTER — TELEPHONE (OUTPATIENT)
Dept: CARDIOLOGY | Facility: CLINIC | Age: 72
End: 2022-06-14
Payer: MEDICARE

## 2022-06-14 NOTE — TELEPHONE ENCOUNTER
I informed pt that Dr. Yeung cleared him for his hip surgery at moderate cardiac risk and it's ok to hold plavix 5 days before surgery. I noted that I will fax Dr. Yeung's note to [facility] orthopedic [facility].

## 2022-06-14 NOTE — TELEPHONE ENCOUNTER
----- Message from Parmjit Tomlinson MA sent at 6/14/2022 10:13 AM CDT -----  Sx clearance. Pt requests sooner appt but no openings.  ----- Message -----  From: Amelia Rosado  Sent: 6/2/2022  10:59 AM CDT  To: [facility] Staff    Type:  Sooner Appointment Request    Patient is requesting a sooner appointment.  Patient declined first available appointment listed as well as another facility and provider .  Patient will not accept being placed on the waitlist and is requesting a message be sent to doctor.    Name of Caller: self     When is the first available appointment? 6/28     Symptoms: clearance for hip surgery/ results     Would the patient rather a call back or a response via [facility]? Call back     Best Call Back Number: [phone] please leave a message if no answer. Thank you.

## 2022-06-20 ENCOUNTER — TELEPHONE (OUTPATIENT)
Dept: FAMILY MEDICINE | Facility: CLINIC | Age: 72
End: 2022-06-20
Payer: MEDICARE

## 2022-06-20 NOTE — TELEPHONE ENCOUNTER
----- Message from Ghada Diaz sent at 6/20/2022 10:36 AM CDT -----  Regarding: self [phone]  Type: Patient Call Back    Who called: self    What is the request in detail: Patient would like to know if he needs labs for his appt on [place].    Can the clinic reply by [facility]? no    Would the patient rather a call back or a response via [facility]? Call back    Best call back number: [phone]

## 2022-06-20 NOTE — TELEPHONE ENCOUNTER
LM informing pt he will need to check with his surgeon since this is for pre-op; will need to see if labs from 5/30 ok or need to be repeated

## 2022-06-22 ENCOUNTER — OFFICE VISIT (OUTPATIENT)
Dept: FAMILY MEDICINE | Facility: CLINIC | Age: 72
End: 2022-06-22
Payer: MEDICARE

## 2022-06-22 VITALS
BODY MASS INDEX: 22.38 KG/M2 | TEMPERATURE: 98 F | HEART RATE: 106 BPM | DIASTOLIC BLOOD PRESSURE: 70 MMHG | WEIGHT: 126.31 LBS | OXYGEN SATURATION: 96 % | HEIGHT: 63 IN | SYSTOLIC BLOOD PRESSURE: 130 MMHG | RESPIRATION RATE: 18 BRPM

## 2022-06-22 DIAGNOSIS — Z12.12 ENCOUNTER FOR COLORECTAL CANCER SCREENING: ICD-10-CM

## 2022-06-22 DIAGNOSIS — Z01.818 PREOPERATIVE CLEARANCE: Primary | ICD-10-CM

## 2022-06-22 DIAGNOSIS — M25.552 LEFT HIP PAIN: ICD-10-CM

## 2022-06-22 DIAGNOSIS — Z12.11 ENCOUNTER FOR COLORECTAL CANCER SCREENING: ICD-10-CM

## 2022-06-22 PROCEDURE — 3078F PR MOST RECENT DIASTOLIC BLOOD PRESSURE < 80 MM HG: ICD-10-PCS | Mod: CPTII,S$GLB,, | Performed by: FAMILY MEDICINE

## 2022-06-22 PROCEDURE — 93005 ELECTROCARDIOGRAM TRACING: CPT | Mod: S$GLB,,, | Performed by: FAMILY MEDICINE

## 2022-06-22 PROCEDURE — 3075F PR MOST RECENT SYSTOLIC BLOOD PRESS GE 130-139MM HG: ICD-10-PCS | Mod: CPTII,S$GLB,, | Performed by: FAMILY MEDICINE

## 2022-06-22 PROCEDURE — 3288F FALL RISK ASSESSMENT DOCD: CPT | Mod: CPTII,S$GLB,, | Performed by: FAMILY MEDICINE

## 2022-06-22 PROCEDURE — 93010 ELECTROCARDIOGRAM REPORT: CPT | Mod: S$GLB,,, | Performed by: INTERNAL MEDICINE

## 2022-06-22 PROCEDURE — 4010F PR ACE/ARB THEARPY RXD/TAKEN: ICD-10-PCS | Mod: CPTII,S$GLB,, | Performed by: FAMILY MEDICINE

## 2022-06-22 PROCEDURE — 3078F DIAST BP <80 MM HG: CPT | Mod: CPTII,S$GLB,, | Performed by: FAMILY MEDICINE

## 2022-06-22 PROCEDURE — 1125F PR PAIN SEVERITY QUANTIFIED, PAIN PRESENT: ICD-10-PCS | Mod: CPTII,S$GLB,, | Performed by: FAMILY MEDICINE

## 2022-06-22 PROCEDURE — 99999 PR PBB SHADOW E&M-EST. PATIENT-LVL IV: ICD-10-PCS | Mod: PBBFAC,,, | Performed by: FAMILY MEDICINE

## 2022-06-22 PROCEDURE — 93010 EKG 12-LEAD: ICD-10-PCS | Mod: S$GLB,,, | Performed by: INTERNAL MEDICINE

## 2022-06-22 PROCEDURE — 3075F SYST BP GE 130 - 139MM HG: CPT | Mod: CPTII,S$GLB,, | Performed by: FAMILY MEDICINE

## 2022-06-22 PROCEDURE — 99214 OFFICE O/P EST MOD 30 MIN: CPT | Mod: S$GLB,,, | Performed by: FAMILY MEDICINE

## 2022-06-22 PROCEDURE — 3288F PR FALLS RISK ASSESSMENT DOCUMENTED: ICD-10-PCS | Mod: CPTII,S$GLB,, | Performed by: FAMILY MEDICINE

## 2022-06-22 PROCEDURE — 1125F AMNT PAIN NOTED PAIN PRSNT: CPT | Mod: CPTII,S$GLB,, | Performed by: FAMILY MEDICINE

## 2022-06-22 PROCEDURE — 1159F MED LIST DOCD IN RCRD: CPT | Mod: CPTII,S$GLB,, | Performed by: FAMILY MEDICINE

## 2022-06-22 PROCEDURE — 1100F PR PT FALLS ASSESS DOC 2+ FALLS/FALL W/INJURY/YR: ICD-10-PCS | Mod: CPTII,S$GLB,, | Performed by: FAMILY MEDICINE

## 2022-06-22 PROCEDURE — 3008F BODY MASS INDEX DOCD: CPT | Mod: CPTII,S$GLB,, | Performed by: FAMILY MEDICINE

## 2022-06-22 PROCEDURE — 99999 PR PBB SHADOW E&M-EST. PATIENT-LVL IV: CPT | Mod: PBBFAC,,, | Performed by: FAMILY MEDICINE

## 2022-06-22 PROCEDURE — 1159F PR MEDICATION LIST DOCUMENTED IN MEDICAL RECORD: ICD-10-PCS | Mod: CPTII,S$GLB,, | Performed by: FAMILY MEDICINE

## 2022-06-22 PROCEDURE — 1100F PTFALLS ASSESS-DOCD GE2>/YR: CPT | Mod: CPTII,S$GLB,, | Performed by: FAMILY MEDICINE

## 2022-06-22 PROCEDURE — 93005 EKG 12-LEAD: ICD-10-PCS | Mod: S$GLB,,, | Performed by: FAMILY MEDICINE

## 2022-06-22 PROCEDURE — 99214 PR OFFICE/OUTPT VISIT, EST, LEVL IV, 30-39 MIN: ICD-10-PCS | Mod: S$GLB,,, | Performed by: FAMILY MEDICINE

## 2022-06-22 PROCEDURE — 3008F PR BODY MASS INDEX (BMI) DOCUMENTED: ICD-10-PCS | Mod: CPTII,S$GLB,, | Performed by: FAMILY MEDICINE

## 2022-06-22 PROCEDURE — 4010F ACE/ARB THERAPY RXD/TAKEN: CPT | Mod: CPTII,S$GLB,, | Performed by: FAMILY MEDICINE

## 2022-06-22 RX ORDER — OMEGA-3 FATTY ACIDS 1000 MG
2 CAPSULE ORAL
COMMUNITY
End: 2023-02-10

## 2022-06-22 RX ORDER — ASPIRIN 81 MG/1
81 TABLET ORAL
COMMUNITY

## 2022-06-22 RX ORDER — LISINOPRIL 10 MG/1
1 TABLET ORAL DAILY
COMMUNITY
Start: 2021-09-14 | End: 2023-02-10

## 2022-06-22 RX ORDER — UBIDECARENONE 75 MG
500 CAPSULE ORAL
COMMUNITY

## 2022-06-22 RX ORDER — ROSUVASTATIN CALCIUM 5 MG/1
1 TABLET, COATED ORAL DAILY
COMMUNITY
Start: 2021-08-26 | End: 2022-08-26

## 2022-06-22 RX ORDER — OXYCODONE HYDROCHLORIDE 10 MG/1
TABLET ORAL
COMMUNITY
Start: 2022-05-19 | End: 2023-02-10

## 2022-06-22 RX ORDER — CLOPIDOGREL BISULFATE 75 MG/1
1 TABLET ORAL DAILY
COMMUNITY
Start: 2021-08-26 | End: 2023-02-10 | Stop reason: SDUPTHER

## 2022-06-22 RX ORDER — IBUPROFEN 100 MG/5ML
1000 SUSPENSION, ORAL (FINAL DOSE FORM) ORAL
COMMUNITY

## 2022-06-22 RX ORDER — LEVOTHYROXINE SODIUM 50 UG/1
50 TABLET ORAL
COMMUNITY
Start: 2022-02-02 | End: 2023-02-02

## 2022-06-22 NOTE — PROGRESS NOTES
Health Maintenance Due   Topic     Hepatitis C Screening  Consult pcp    Shingles Vaccine (1 of 2)  hx chicken pox. Notified pt can get vaccine at pharmacy    Colorectal Cancer Screening  Pending order    LDCT Lung Screen  Consult pcp    Abdominal Aortic Aneurysm Screening  Consult pcp    COVID-19 Vaccine (4 - Booster for Pfizer series)

## 2022-06-27 NOTE — PROGRESS NOTES
Subjective:       Patient ID: Micheal Monroe is a 71 y.o. male.    Chief Complaint: Pre-op Exam      HPI  71-year-old male presents for preop exam.  Patient will undergo left hip surgery.  Patient states he had a fall few weeks ago.  Patient has been cleared by Cardiology already.  Patient was advised to hold his Plavix 5 days prior to surgery.      Review of Systems   Constitutional: Negative.    HENT: Negative.    Respiratory: Negative.    Cardiovascular: Negative.    Gastrointestinal: Negative.    Endocrine: Negative.    Genitourinary: Negative.    Musculoskeletal: Positive for arthralgias.   Neurological: Negative.    Psychiatric/Behavioral: Negative.           Past Medical History:   Diagnosis Date    Acid reflux     Anticoagulant long-term use     PLAVIX and ASPIRIN    Coronary artery disease     had angiogram--pt refused STENT placement    Diverticula of intestine     Hematuria     Hypercholesteremia     Hypertension     Kidney stone      Past Surgical History:   Procedure Laterality Date    EXTRACORPOREAL SHOCK WAVE LITHOTRIPSY      x 4 episodes in past    EYE SURGERY      removal of kidney stones      with nephrostomy tube in place for awhile     History reviewed. No pertinent family history.  Social History     Socioeconomic History    Marital status: Single   Tobacco Use    Smoking status: Current Every Day Smoker     Packs/day: 0.50     Years: 40.00     Pack years: 20.00    Smokeless tobacco: Never Used   Substance and Sexual Activity    Alcohol use: No    Drug use: No    Sexual activity: Yes       Current Outpatient Medications:     clopidogreL (PLAVIX) 75 mg tablet, Take 1 tablet by mouth once daily., Disp: , Rfl:     levothyroxine (SYNTHROID) 50 MCG tablet, Take 50 mcg by mouth before breakfast., Disp: , Rfl:     levothyroxine (SYNTHROID) 50 MCG tablet, Take 50 mcg by mouth., Disp: , Rfl:     lisinopriL 10 MG tablet, Take 1 tablet by mouth once daily., Disp: , Rfl:      rosuvastatin (CRESTOR) 5 MG tablet, Take 1 tablet by mouth once daily., Disp: , Rfl:     ANUCORT-HC 25 mg suppository, Place 25 mg rectally daily as needed. , Disp: , Rfl:     ascorbic acid, vitamin C, (VITAMIN C) 1000 MG tablet, Take 1,000 mg by mouth., Disp: , Rfl:     ascorbic acid, vitamin C, (VITAMIN C) 500 MG tablet, Take 500 mg by mouth once daily., Disp: , Rfl:     aspirin (ECOTRIN) 81 MG EC tablet, Take 81 mg by mouth., Disp: , Rfl:     aspirin 81 MG Chew, Take 81 mg by mouth every evening., Disp: , Rfl:     clopidogrel (PLAVIX) 75 mg tablet, Take 75 mg by mouth once. , Disp: , Rfl:     coenzyme Q10 100 mg capsule, Take 100 mg by mouth once daily. , Disp: , Rfl: 0    CRESTOR 20 mg tablet, Take 5 mg by mouth every evening., Disp: , Rfl:     cyanocobalamin (VITAMIN B-12) 1000 MCG tablet, Take 1 tablet by mouth every other day., Disp: , Rfl:     cyanocobalamin 500 MCG tablet, Take 500 mcg by mouth., Disp: , Rfl:     ferrous sulfate (FEOSOL) 325 mg (65 mg iron) Tab tablet, Take 250 mg by mouth., Disp: , Rfl:     fish oil-omega-3 fatty acids 300-1,000 mg capsule, Take 1 g by mouth 2 (two) times daily., Disp: , Rfl:     lisinopril 10 MG tablet, Take 10 mg by mouth every evening. , Disp: , Rfl:     omega-3 fatty acids 1,000 mg Cap, Take 2 g by mouth., Disp: , Rfl:     oxyCODONE (ROXICODONE) 10 mg Tab immediate release tablet, TAKE 1 TABLET BY MOUTH EVERY 4 HOURS AS NEEDEDF FOR PAIN, Disp: , Rfl:     pantoprazole (PROTONIX) 40 MG tablet, Take 40 mg by mouth nightly. , Disp: , Rfl:     phenazopyridine (PYRIDIUM) 200 MG tablet, Take 1 tablet (200 mg total) by mouth 3 (three) times daily as needed for Pain (Burning). (Patient not taking: No sig reported), Disp: 21 tablet, Rfl: 0    tamsulosin (FLOMAX) 0.4 mg Cp24, Take 1 capsule (0.4 mg total) by mouth once daily. (Patient taking differently: No sig reported), Disp: 30 capsule, Rfl: 11   Objective:      Vitals:    06/22/22 1511   BP: 130/70   BP  "Location: Left arm   Patient Position: Sitting   BP Method: Small (Manual)   Pulse: 106   Resp: 18   Temp: 98.3 °F (36.8 °C)   TempSrc: Oral   SpO2: 96%   Weight: 57.3 kg (126 lb 5.2 oz)   Height: 5' 3" (1.6 m)       Physical Exam  Constitutional:       General: He is not in acute distress.  HENT:      Head: Normocephalic and atraumatic.   Eyes:      Conjunctiva/sclera: Conjunctivae normal.   Cardiovascular:      Rate and Rhythm: Normal rate and regular rhythm.      Heart sounds: Normal heart sounds. No murmur heard.    No friction rub. No gallop.   Pulmonary:      Effort: Pulmonary effort is normal.      Breath sounds: Normal breath sounds. No wheezing or rales.   Musculoskeletal:      Cervical back: Neck supple.   Skin:     General: Skin is warm and dry.   Neurological:      Mental Status: He is alert and oriented to person, place, and time.   Psychiatric:         Behavior: Behavior normal.         Thought Content: Thought content normal.         Judgment: Judgment normal.            Assessment:       1. Preoperative clearance    2. Encounter for colorectal cancer screening    3. Left hip pain        Plan:       Preoperative clearance  -     IN OFFICE EKG 12-LEAD (to [facility])    Encounter for colorectal cancer screening    Left hip pain  -     IN OFFICE EKG 12-LEAD (to [place])    cleared for surgery. Cards cleared patient as well. EKG was wnl.            Patient note was created using [employer] ([employer]  Any errors in syntax or even information may not have been identified and edited on initial review prior to signing this note.  "

## 2023-01-17 ENCOUNTER — TELEPHONE (OUTPATIENT)
Dept: FAMILY MEDICINE | Facility: CLINIC | Age: 73
End: 2023-01-17
Payer: MEDICARE

## 2023-01-17 DIAGNOSIS — M25.552 LEFT HIP PAIN: Primary | ICD-10-CM
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
## 2023-01-17 NOTE — TELEPHONE ENCOUNTER
----- Message from Romana Covarrubias sent at 1/17/2023  2:27 PM CST -----  Type: Patient Call Back    Who called:pt     What is the request in detail:pt requesting to get referral for pain management at [place] for dr quan. Call pt     Can the clinic reply by [facility]?    Would the patient rather a call back or a response via [facility]? call    Best call back number:[phone] (home)       Additional Information:

## 2023-02-01 ENCOUNTER — OFFICE VISIT (OUTPATIENT)
Dept: CARDIOLOGY | Facility: CLINIC | Age: 73
End: 2023-02-01
Payer: MEDICARE

## 2023-02-01 ENCOUNTER — HOSPITAL ENCOUNTER (EMERGENCY)
Facility: HOSPITAL | Age: 73
Discharge: HOME | End: 2023-02-01
Attending: EMERGENCY MEDICINE
Payer: MEDICARE

## 2023-02-01 VITALS
HEART RATE: 99 BPM | SYSTOLIC BLOOD PRESSURE: 143 MMHG | RESPIRATION RATE: 18 BRPM | BODY MASS INDEX: 24.49 KG/M2 | WEIGHT: 138.25 LBS | OXYGEN SATURATION: 96 % | DIASTOLIC BLOOD PRESSURE: 79 MMHG

## 2023-02-01 VITALS
OXYGEN SATURATION: 98 % | DIASTOLIC BLOOD PRESSURE: 84 MMHG | HEART RATE: 83 BPM | RESPIRATION RATE: 18 BRPM | HEIGHT: 63 IN | WEIGHT: 138.25 LBS | BODY MASS INDEX: 24.5 KG/M2 | TEMPERATURE: 98 F | SYSTOLIC BLOOD PRESSURE: 173 MMHG

## 2023-02-01 DIAGNOSIS — I25.119 CORONARY ARTERY DISEASE INVOLVING NATIVE CORONARY ARTERY OF NATIVE HEART WITH ANGINA PECTORIS: Primary | ICD-10-CM

## 2023-02-01 DIAGNOSIS — S22.000A COMPRESSION FRACTURE OF THORACIC VERTEBRA, UNSPECIFIED THORACIC VERTEBRAL LEVEL, INITIAL ENCOUNTER: Primary | ICD-10-CM

## 2023-02-01 DIAGNOSIS — E78.00 PURE HYPERCHOLESTEROLEMIA: ICD-10-CM

## 2023-02-01 DIAGNOSIS — I10 PRIMARY HYPERTENSION: ICD-10-CM

## 2023-02-01 DIAGNOSIS — R06.09 DOE (DYSPNEA ON EXERTION): ICD-10-CM

## 2023-02-01 DIAGNOSIS — M54.9 BACK PAIN: ICD-10-CM

## 2023-02-01 DIAGNOSIS — R07.9 CHEST PAIN: ICD-10-CM

## 2023-02-01 DIAGNOSIS — R07.89 CHEST PAIN, ATYPICAL: ICD-10-CM

## 2023-02-01 LAB
ALBUMIN SERPL BCP-MCNC: 3.3 G/DL (ref 3.5–5.2)
ALP SERPL-CCNC: 119 U/L (ref 55–135)
ALT SERPL W/O P-5'-P-CCNC: 9 U/L (ref 10–44)
ANION GAP SERPL CALC-SCNC: 12 MMOL/L (ref 8–16)
AST SERPL-CCNC: 18 U/L (ref 10–40)
BASOPHILS # BLD AUTO: 0.03 K/UL (ref 0–0.2)
BASOPHILS NFR BLD: 0.2 % (ref 0–1.9)
BILIRUB SERPL-MCNC: 0.3 MG/DL (ref 0.1–1)
BNP SERPL-MCNC: <10 PG/ML (ref 0–99)
BUN SERPL-MCNC: 17 MG/DL (ref 8–23)
CALCIUM SERPL-MCNC: 10 MG/DL (ref 8.7–10.5)
CHLORIDE SERPL-SCNC: 103 MMOL/L (ref 95–110)
CO2 SERPL-SCNC: 24 MMOL/L (ref 23–29)
CREAT SERPL-MCNC: 0.9 MG/DL (ref 0.5–1.4)
DIFFERENTIAL METHOD: ABNORMAL
EOSINOPHIL # BLD AUTO: 0.1 K/UL (ref 0–0.5)
EOSINOPHIL NFR BLD: 0.4 % (ref 0–8)
ERYTHROCYTE [DISTWIDTH] IN BLOOD BY AUTOMATED COUNT: 17.2 % (ref 11.5–14.5)
EST. GFR  (NO RACE VARIABLE): >60 ML/MIN/1.73 M^2
GLUCOSE SERPL-MCNC: 118 MG/DL (ref 70–110)
HCT VFR BLD AUTO: 41.6 % (ref 40–54)
HGB BLD-MCNC: 13.3 G/DL (ref 14–18)
IMM GRANULOCYTES # BLD AUTO: 0.08 K/UL (ref 0–0.04)
IMM GRANULOCYTES NFR BLD AUTO: 0.7 % (ref 0–0.5)
LYMPHOCYTES # BLD AUTO: 0.7 K/UL (ref 1–4.8)
LYMPHOCYTES NFR BLD: 5.9 % (ref 18–48)
MCH RBC QN AUTO: 26.1 PG (ref 27–31)
MCHC RBC AUTO-ENTMCNC: 32 G/DL (ref 32–36)
MCV RBC AUTO: 82 FL (ref 82–98)
MONOCYTES # BLD AUTO: 0.7 K/UL (ref 0.3–1)
MONOCYTES NFR BLD: 5.3 % (ref 4–15)
NEUTROPHILS # BLD AUTO: 10.7 K/UL (ref 1.8–7.7)
NEUTROPHILS NFR BLD: 87.5 % (ref 38–73)
NRBC BLD-RTO: 0 /100 WBC
PLATELET # BLD AUTO: 224 K/UL (ref 150–450)
PMV BLD AUTO: 9.2 FL (ref 9.2–12.9)
POTASSIUM SERPL-SCNC: 4.2 MMOL/L (ref 3.5–5.1)
PROT SERPL-MCNC: 7.6 G/DL (ref 6–8.4)
RBC # BLD AUTO: 5.09 M/UL (ref 4.6–6.2)
SODIUM SERPL-SCNC: 139 MMOL/L (ref 136–145)
TROPONIN I SERPL DL<=0.01 NG/ML-MCNC: <0.006 NG/ML (ref 0–0.03)
TROPONIN I SERPL DL<=0.01 NG/ML-MCNC: <0.006 NG/ML (ref 0–0.03)
WBC # BLD AUTO: 12.21 K/UL (ref 3.9–12.7)

## 2023-02-01 PROCEDURE — 1159F PR MEDICATION LIST DOCUMENTED IN MEDICAL RECORD: ICD-10-PCS | Mod: CPTII,S$GLB,, | Performed by: INTERNAL MEDICINE

## 2023-02-01 PROCEDURE — 93010 EKG 12-LEAD: ICD-10-PCS | Mod: ,,, | Performed by: INTERNAL MEDICINE

## 2023-02-01 PROCEDURE — 96374 THER/PROPH/DIAG INJ IV PUSH: CPT

## 2023-02-01 PROCEDURE — 3077F SYST BP >= 140 MM HG: CPT | Mod: CPTII,S$GLB,, | Performed by: INTERNAL MEDICINE

## 2023-02-01 PROCEDURE — 3078F DIAST BP <80 MM HG: CPT | Mod: CPTII,S$GLB,, | Performed by: INTERNAL MEDICINE

## 2023-02-01 PROCEDURE — 3008F PR BODY MASS INDEX (BMI) DOCUMENTED: ICD-10-PCS | Mod: CPTII,S$GLB,, | Performed by: INTERNAL MEDICINE

## 2023-02-01 PROCEDURE — 99999 PR PBB SHADOW E&M-EST. PATIENT-LVL IV: CPT | Mod: PBBFAC,,, | Performed by: INTERNAL MEDICINE

## 2023-02-01 PROCEDURE — 93010 ELECTROCARDIOGRAM REPORT: CPT | Mod: 76,,, | Performed by: INTERNAL MEDICINE

## 2023-02-01 PROCEDURE — 63600175 PHARM REV CODE 636 W HCPCS: Performed by: EMERGENCY MEDICINE

## 2023-02-01 PROCEDURE — 83880 ASSAY OF NATRIURETIC PEPTIDE: CPT | Performed by: NURSE PRACTITIONER

## 2023-02-01 PROCEDURE — 1159F MED LIST DOCD IN RCRD: CPT | Mod: CPTII,S$GLB,, | Performed by: INTERNAL MEDICINE

## 2023-02-01 PROCEDURE — 1125F AMNT PAIN NOTED PAIN PRSNT: CPT | Mod: CPTII,S$GLB,, | Performed by: INTERNAL MEDICINE

## 2023-02-01 PROCEDURE — 3008F BODY MASS INDEX DOCD: CPT | Mod: CPTII,S$GLB,, | Performed by: INTERNAL MEDICINE

## 2023-02-01 PROCEDURE — 93005 ELECTROCARDIOGRAM TRACING: CPT

## 2023-02-01 PROCEDURE — 85025 COMPLETE CBC W/AUTO DIFF WBC: CPT | Performed by: NURSE PRACTITIONER

## 2023-02-01 PROCEDURE — 99214 OFFICE O/P EST MOD 30 MIN: CPT | Mod: S$GLB,,, | Performed by: INTERNAL MEDICINE

## 2023-02-01 PROCEDURE — 1125F PR PAIN SEVERITY QUANTIFIED, PAIN PRESENT: ICD-10-PCS | Mod: CPTII,S$GLB,, | Performed by: INTERNAL MEDICINE

## 2023-02-01 PROCEDURE — 3078F PR MOST RECENT DIASTOLIC BLOOD PRESSURE < 80 MM HG: ICD-10-PCS | Mod: CPTII,S$GLB,, | Performed by: INTERNAL MEDICINE

## 2023-02-01 PROCEDURE — 99214 PR OFFICE/OUTPT VISIT, EST, LEVL IV, 30-39 MIN: ICD-10-PCS | Mod: S$GLB,,, | Performed by: INTERNAL MEDICINE

## 2023-02-01 PROCEDURE — 99285 EMERGENCY DEPT VISIT HI MDM: CPT | Mod: 25

## 2023-02-01 PROCEDURE — 3077F PR MOST RECENT SYSTOLIC BLOOD PRESSURE >= 140 MM HG: ICD-10-PCS | Mod: CPTII,S$GLB,, | Performed by: INTERNAL MEDICINE

## 2023-02-01 PROCEDURE — 25000003 PHARM REV CODE 250: Performed by: EMERGENCY MEDICINE

## 2023-02-01 PROCEDURE — 93010 ELECTROCARDIOGRAM REPORT: CPT | Mod: ,,, | Performed by: INTERNAL MEDICINE

## 2023-02-01 PROCEDURE — 99999 PR PBB SHADOW E&M-EST. PATIENT-LVL IV: ICD-10-PCS | Mod: PBBFAC,,, | Performed by: INTERNAL MEDICINE

## 2023-02-01 PROCEDURE — 80053 COMPREHEN METABOLIC PANEL: CPT | Performed by: NURSE PRACTITIONER

## 2023-02-01 PROCEDURE — 84484 ASSAY OF TROPONIN QUANT: CPT | Performed by: NURSE PRACTITIONER

## 2023-02-01 RX ORDER — ASPIRIN 325 MG
325 TABLET ORAL
Status: COMPLETED | OUTPATIENT
Start: 2023-02-01 | End: 2023-02-01

## 2023-02-01 RX ORDER — LIDOCAINE 50 MG/G
1 PATCH TOPICAL
Status: DISCONTINUED | OUTPATIENT
Start: 2023-02-01 | End: 2023-02-01 | Stop reason: HOSPADM

## 2023-02-01 RX ORDER — HYDROMORPHONE HYDROCHLORIDE 2 MG/ML
0.5 INJECTION, SOLUTION INTRAMUSCULAR; INTRAVENOUS; SUBCUTANEOUS
Status: COMPLETED | OUTPATIENT
Start: 2023-02-01 | End: 2023-02-01

## 2023-02-01 RX ADMIN — LIDOCAINE 1 PATCH: 50 PATCH TOPICAL at 03:02

## 2023-02-01 RX ADMIN — HYDROMORPHONE HYDROCHLORIDE 0.5 MG: 2 INJECTION, SOLUTION INTRAMUSCULAR; INTRAVENOUS; SUBCUTANEOUS at 06:02

## 2023-02-01 RX ADMIN — ASPIRIN 325 MG ORAL TABLET 325 MG: 325 PILL ORAL at 03:02
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
## 2023-02-01 NOTE — PROGRESS NOTES
Subjective:    Patient ID:  Micheal Monroe is a 72 y.o. male who presents for follow-up of No chief complaint on file.      HPI    Follow[facility] by Dr Noah NARAYAN [facility]     Essential hypertension  Comments:  Controlled on current regimen.    Hyperlipidemia, unspecified hyperlipidemia type  Comments:  Stable. Monitor lipid panel.    Chronic obstructive pulmonary disease, unspecified COPD type  Comments:  Continue treatment as per Pulmonary.    Coronary artery disease due to lipid rich plaque  Comments:  Stable. Follow up with Cardiology    Non-Hodgkin's lymphoma, unspecified body region, unspecified non-Hodgkin lymphoma type  Comments:  Continue treatment with H[facility]     Stress test 4/12/22    Normal myocardial perfusion scan. There is no evidence of myocardial ischemia or infarction.    There is a  mild to moderate intensity fixed perfusion abnormality in the inferior wall of the left ventricle secondary to diaphragm attenuation.    The gated perfusion images showed an ejection fraction of 78% post stress.    There is normal wall motion post stress.    The EKG portion of this study is negative for ischemia.    The patient reported no chest pain during the stress test.    There were no arrhythmias during stress.    Echo 4/12/22  The left ventricle is normal in size with mild concentric hypertrophy and normal systolic function.  The estimated ejection fraction is 55%.  Normal left ventricular diastolic function.  Normal right ventricular size with normal right ventricular systolic function.  Normal central venous pressure (3 mmHg).  The estimated PA systolic pressure is 17 mmHg.  There is no pulmonary hypertension.        3/24/22 Here for evaluation of CAD - reports remote [facility] 15 years ago - was told he needed a stent but he declined. Reports some increased BROOKS. Last week developed severe chest tightness radiating to left arm while replacing a radiator in a car  Smokes 1/2 ppd. Gets hip pain from cancer  met - per patient  EKG NSR - ok  Echo and lexiscan myoview for CP and BROOKS - cannot walk treadmill due to hip issues  Continue Rx for HTN, HLD, CAD    2/1/23 Reports low back pain for several weeks with some radiation to lower rib cage - position. Denies chest tightness or SOB  EKG NSR LAE otherwise ok    Review of Systems   Constitutional: Negative for decreased appetite.   HENT:  Negative for ear discharge.    Eyes:  Negative for blurred vision.   Endocrine: Negative for polyphagia.   Skin:  Negative for nail changes.   Genitourinary:  Negative for bladder incontinence.   Neurological:  Negative for aphonia.   Psychiatric/Behavioral:  Negative for hallucinations.    Allergic/Immunologic: Negative for hives.      Objective:    Physical Exam  Constitutional:       Appearance: He is well-developed.   HENT:      Head: Normocephalic and atraumatic.   Eyes:      Conjunctiva/sclera: Conjunctivae normal.      Pupils: Pupils are equal, round, and reactive to light.   Cardiovascular:      Rate and Rhythm: Normal rate.      Pulses: Intact distal pulses.      Heart sounds: Normal heart sounds.   Pulmonary:      Effort: Pulmonary effort is normal.      Breath sounds: Normal breath sounds.   Abdominal:      General: Bowel sounds are normal.      Palpations: Abdomen is soft.   Musculoskeletal:         General: Normal range of motion.      Cervical back: Normal range of motion and neck supple.   Skin:     General: Skin is warm and dry.   Neurological:      Mental Status: He is alert and oriented to person, place, and time.         Assessment:       1. Coronary artery disease involving native coronary artery of native heart with angina pectoris    2. Primary hypertension    3. Pure hypercholesterolemia    4. BROOKS (dyspnea on exertion)    5. Chest pain, atypical         Plan:       CP atypical - likely musculoskeletal. If this progresses would recommend ER evaluation  Continue Rx for HTN, HLD, CAD  OV 6 months

## 2023-02-01 NOTE — ED TRIAGE NOTES
"Pt arrived to ED with c/o left side chest pain x2 weeks. Pt reports seeing cardiologist yesterday states"The  Said it was not my heart." Pt reports the pain is getting worse and unable to tolerate it. +tenderness to left side on palpation. Pt reports CP when taking a deep breath "feels like a rubber band around me." Pt denies N/V/D, dizziness. MD at bedside.  "

## 2023-02-02 ENCOUNTER — TELEPHONE (OUTPATIENT)
Dept: NEUROSURGERY | Facility: CLINIC | Age: 73
End: 2023-02-02
Payer: MEDICARE

## 2023-02-02 NOTE — DISCHARGE INSTRUCTIONS
Please return to the ED immediately for any numbness, weakness, numbness in your groin, fever, stool or urinating on yourself or inability to stool or urinate. Follow up with your primary care doctor in 2-3 days to discuss recent ED visit and to discuss physical therapy or MRI. Please follow up closely with orthopedics/spine surgeron as soon as possible. You need to wear your TLSO brace while out of the bed at all times for the next 8 weeks.     Thank you for coming to our Emergency Department today. As we discussed, it is important to remember that some problems are difficult to diagnose and may not be found during your Emergency Department visit. Be sure to follow up with your primary care doctor and review all labs/imaging/tests that were performed during this visit with them. Some labs/tests may be outside of the normal range and require non-emergent follow-up and further investigation to help diagnose/exclude/prevent complications or other medical conditions.    If you do not have a primary care doctor, you may contact the one listed on your discharge paperwork or you may also call the [facility] Appointment Desk at [phone] to schedule an appointment and establish care with one. It is important to your health that you have a primary care doctor.    Please take all medications as directed. All medications may potentially have side-effects and it is impossible to predict which medications may give you side-effects or what side-effects (if any) they will give you.. If you feel that you are having a negative effect or side-effect of any medication you should immediately stop taking them and seek medical attention. If you feel that you are having a life-threatening reaction call 911.    Return to the ER with any questions/concerns, new/concerning symptoms, worsening or failure to improve.     Do not drive, swim, climb to height, take a bath or make any important decisions for 24 hours if you have received  any pain medications, sedatives or mood altering drugs during your ER visit.

## 2023-02-02 NOTE — TELEPHONE ENCOUNTER
----- Message from Lina Torres sent at 2/2/2023 12:47 PM CST -----  Regarding: URGENT APPT  Contact: [phone]  Pt was in Er last night and has a referral to see neurosurgery within 3 days. Please call to advise.

## 2023-02-02 NOTE — TELEPHONE ENCOUNTER
MARCO patient and he confirmed his appt w/Emi for hospital F/U for comp fracture.  Patient is compliant w/brace and advised patient to contact [facility] for more pain meds bc we cannot prescribe.  V/U.

## 2023-02-10 ENCOUNTER — LAB VISIT (OUTPATIENT)
Dept: LAB | Facility: HOSPITAL | Age: 73
End: 2023-02-10
Attending: FAMILY MEDICINE
Payer: MEDICARE

## 2023-02-10 ENCOUNTER — OFFICE VISIT (OUTPATIENT)
Dept: FAMILY MEDICINE | Facility: CLINIC | Age: 73
End: 2023-02-10
Payer: MEDICARE

## 2023-02-10 VITALS
BODY MASS INDEX: 24.34 KG/M2 | DIASTOLIC BLOOD PRESSURE: 84 MMHG | HEART RATE: 113 BPM | HEIGHT: 63 IN | TEMPERATURE: 98 F | SYSTOLIC BLOOD PRESSURE: 138 MMHG | WEIGHT: 137.38 LBS | RESPIRATION RATE: 18 BRPM | OXYGEN SATURATION: 96 %

## 2023-02-10 DIAGNOSIS — I73.9 PVD (PERIPHERAL VASCULAR DISEASE): ICD-10-CM

## 2023-02-10 DIAGNOSIS — I10 PRIMARY HYPERTENSION: ICD-10-CM

## 2023-02-10 DIAGNOSIS — I25.119 CORONARY ARTERY DISEASE INVOLVING NATIVE CORONARY ARTERY OF NATIVE HEART WITH ANGINA PECTORIS: ICD-10-CM

## 2023-02-10 DIAGNOSIS — J44.9 CHRONIC OBSTRUCTIVE PULMONARY DISEASE, UNSPECIFIED COPD TYPE: ICD-10-CM

## 2023-02-10 DIAGNOSIS — S22.050D COMPRESSION FRACTURE OF T6 VERTEBRA WITH ROUTINE HEALING, SUBSEQUENT ENCOUNTER: ICD-10-CM

## 2023-02-10 DIAGNOSIS — Z12.12 ENCOUNTER FOR COLORECTAL CANCER SCREENING: ICD-10-CM

## 2023-02-10 DIAGNOSIS — G89.29 CHRONIC BILATERAL LOW BACK PAIN, UNSPECIFIED WHETHER SCIATICA PRESENT: ICD-10-CM

## 2023-02-10 DIAGNOSIS — R79.9 ABNORMAL FINDING OF BLOOD CHEMISTRY, UNSPECIFIED: ICD-10-CM

## 2023-02-10 DIAGNOSIS — Z12.11 ENCOUNTER FOR COLORECTAL CANCER SCREENING: ICD-10-CM

## 2023-02-10 DIAGNOSIS — Z00.00 ANNUAL PHYSICAL EXAM: Primary | ICD-10-CM

## 2023-02-10 DIAGNOSIS — C85.90 LYMPHOMA, UNSPECIFIED BODY REGION, UNSPECIFIED LYMPHOMA TYPE: ICD-10-CM

## 2023-02-10 DIAGNOSIS — E03.9 HYPOTHYROIDISM, UNSPECIFIED TYPE: ICD-10-CM

## 2023-02-10 DIAGNOSIS — E78.00 PURE HYPERCHOLESTEROLEMIA: ICD-10-CM

## 2023-02-10 DIAGNOSIS — M54.50 CHRONIC BILATERAL LOW BACK PAIN, UNSPECIFIED WHETHER SCIATICA PRESENT: ICD-10-CM

## 2023-02-10 PROCEDURE — 83036 HEMOGLOBIN GLYCOSYLATED A1C: CPT | Performed by: FAMILY MEDICINE

## 2023-02-10 PROCEDURE — 3075F SYST BP GE 130 - 139MM HG: CPT | Mod: CPTII,S$GLB,, | Performed by: FAMILY MEDICINE

## 2023-02-10 PROCEDURE — 80053 COMPREHEN METABOLIC PANEL: CPT | Performed by: FAMILY MEDICINE

## 2023-02-10 PROCEDURE — 3008F PR BODY MASS INDEX (BMI) DOCUMENTED: ICD-10-PCS | Mod: CPTII,S$GLB,, | Performed by: FAMILY MEDICINE

## 2023-02-10 PROCEDURE — 3288F PR FALLS RISK ASSESSMENT DOCUMENTED: ICD-10-PCS | Mod: CPTII,S$GLB,, | Performed by: FAMILY MEDICINE

## 2023-02-10 PROCEDURE — 3288F FALL RISK ASSESSMENT DOCD: CPT | Mod: CPTII,S$GLB,, | Performed by: FAMILY MEDICINE

## 2023-02-10 PROCEDURE — 1125F PR PAIN SEVERITY QUANTIFIED, PAIN PRESENT: ICD-10-PCS | Mod: CPTII,S$GLB,, | Performed by: FAMILY MEDICINE

## 2023-02-10 PROCEDURE — 4010F PR ACE/ARB THEARPY RXD/TAKEN: ICD-10-PCS | Mod: CPTII,S$GLB,, | Performed by: FAMILY MEDICINE

## 2023-02-10 PROCEDURE — 1101F PT FALLS ASSESS-DOCD LE1/YR: CPT | Mod: CPTII,S$GLB,, | Performed by: FAMILY MEDICINE

## 2023-02-10 PROCEDURE — 1101F PR PT FALLS ASSESS DOC 0-1 FALLS W/OUT INJ PAST YR: ICD-10-PCS | Mod: CPTII,S$GLB,, | Performed by: FAMILY MEDICINE

## 2023-02-10 PROCEDURE — 3079F DIAST BP 80-89 MM HG: CPT | Mod: CPTII,S$GLB,, | Performed by: FAMILY MEDICINE

## 2023-02-10 PROCEDURE — 84443 ASSAY THYROID STIM HORMONE: CPT | Performed by: FAMILY MEDICINE

## 2023-02-10 PROCEDURE — 3044F HG A1C LEVEL LT 7.0%: CPT | Mod: CPTII,S$GLB,, | Performed by: FAMILY MEDICINE

## 2023-02-10 PROCEDURE — 80061 LIPID PANEL: CPT | Performed by: FAMILY MEDICINE

## 2023-02-10 PROCEDURE — 4010F ACE/ARB THERAPY RXD/TAKEN: CPT | Mod: CPTII,S$GLB,, | Performed by: FAMILY MEDICINE

## 2023-02-10 PROCEDURE — 85025 COMPLETE CBC W/AUTO DIFF WBC: CPT | Performed by: FAMILY MEDICINE

## 2023-02-10 PROCEDURE — 99397 PER PM REEVAL EST PAT 65+ YR: CPT | Mod: S$GLB,,, | Performed by: FAMILY MEDICINE

## 2023-02-10 PROCEDURE — 1159F PR MEDICATION LIST DOCUMENTED IN MEDICAL RECORD: ICD-10-PCS | Mod: CPTII,S$GLB,, | Performed by: FAMILY MEDICINE

## 2023-02-10 PROCEDURE — 36415 COLL VENOUS BLD VENIPUNCTURE: CPT | Mod: PO | Performed by: FAMILY MEDICINE

## 2023-02-10 PROCEDURE — 84439 ASSAY OF FREE THYROXINE: CPT | Performed by: FAMILY MEDICINE

## 2023-02-10 PROCEDURE — 3079F PR MOST RECENT DIASTOLIC BLOOD PRESSURE 80-89 MM HG: ICD-10-PCS | Mod: CPTII,S$GLB,, | Performed by: FAMILY MEDICINE

## 2023-02-10 PROCEDURE — 3044F PR MOST RECENT HEMOGLOBIN A1C LEVEL <7.0%: ICD-10-PCS | Mod: CPTII,S$GLB,, | Performed by: FAMILY MEDICINE

## 2023-02-10 PROCEDURE — 1159F MED LIST DOCD IN RCRD: CPT | Mod: CPTII,S$GLB,, | Performed by: FAMILY MEDICINE

## 2023-02-10 PROCEDURE — 3075F PR MOST RECENT SYSTOLIC BLOOD PRESS GE 130-139MM HG: ICD-10-PCS | Mod: CPTII,S$GLB,, | Performed by: FAMILY MEDICINE

## 2023-02-10 PROCEDURE — 99397 PR PREVENTIVE VISIT,EST,65 & OVER: ICD-10-PCS | Mod: S$GLB,,, | Performed by: FAMILY MEDICINE

## 2023-02-10 PROCEDURE — 99999 PR PBB SHADOW E&M-EST. PATIENT-LVL V: CPT | Mod: PBBFAC,,, | Performed by: FAMILY MEDICINE

## 2023-02-10 PROCEDURE — 1125F AMNT PAIN NOTED PAIN PRSNT: CPT | Mod: CPTII,S$GLB,, | Performed by: FAMILY MEDICINE

## 2023-02-10 PROCEDURE — 3008F BODY MASS INDEX DOCD: CPT | Mod: CPTII,S$GLB,, | Performed by: FAMILY MEDICINE

## 2023-02-10 PROCEDURE — 99999 PR PBB SHADOW E&M-EST. PATIENT-LVL V: ICD-10-PCS | Mod: PBBFAC,,, | Performed by: FAMILY MEDICINE

## 2023-02-10 RX ORDER — EPINEPHRINE 0.22MG
100 AEROSOL WITH ADAPTER (ML) INHALATION
COMMUNITY

## 2023-02-10 RX ORDER — ASCORBIC ACID 1000 MG
175 TABLET ORAL
COMMUNITY
End: 2023-02-10

## 2023-02-10 RX ORDER — OXYCODONE HYDROCHLORIDE 5 MG/1
TABLET ORAL
COMMUNITY
Start: 2022-08-24 | End: 2023-02-10

## 2023-02-10 RX ORDER — HYDROCODONE BITARTRATE AND ACETAMINOPHEN 5; 325 MG/1; MG/1
TABLET ORAL
COMMUNITY
Start: 2023-02-03

## 2023-02-10 RX ORDER — OXYCODONE AND ACETAMINOPHEN 10; 325 MG/1; MG/1
1 TABLET ORAL EVERY 4 HOURS PRN
COMMUNITY
Start: 2022-10-13 | End: 2023-02-10

## 2023-02-10 RX ORDER — LEVOTHYROXINE SODIUM 50 UG/1
50 TABLET ORAL EVERY MORNING
COMMUNITY
Start: 2022-10-14 | End: 2023-02-10

## 2023-02-10 RX ORDER — UBIDECARENONE 75 MG
500 CAPSULE ORAL
COMMUNITY

## 2023-02-10 RX ORDER — LEVOTHYROXINE SODIUM 50 UG/1
50 TABLET ORAL
Qty: 90 TABLET | Refills: 3 | Status: SHIPPED | OUTPATIENT
Start: 2023-02-10

## 2023-02-10 RX ORDER — CLOPIDOGREL BISULFATE 75 MG/1
75 TABLET ORAL DAILY
Qty: 90 TABLET | Refills: 3 | Status: SHIPPED | OUTPATIENT
Start: 2023-02-10

## 2023-02-10 RX ORDER — ROSUVASTATIN CALCIUM 10 MG/1
10 TABLET, COATED ORAL NIGHTLY
Qty: 90 TABLET | Refills: 3 | Status: SHIPPED | OUTPATIENT
Start: 2023-02-10

## 2023-02-10 RX ORDER — MUPIROCIN 20 MG/G
1 OINTMENT TOPICAL
COMMUNITY
Start: 2022-07-11

## 2023-02-10 RX ORDER — CYCLOBENZAPRINE HCL 10 MG
10 TABLET ORAL
COMMUNITY
Start: 2022-11-03

## 2023-02-10 RX ORDER — OXYCODONE AND ACETAMINOPHEN 5; 325 MG/1; MG/1
1 TABLET ORAL
COMMUNITY
Start: 2022-12-20 | End: 2023-02-10 | Stop reason: SDUPTHER

## 2023-02-10 RX ORDER — DANDELION ROOT 525 MG
1 CAPSULE ORAL
COMMUNITY

## 2023-02-10 RX ORDER — OXYCODONE AND ACETAMINOPHEN 7.5; 325 MG/1; MG/1
1 TABLET ORAL EVERY 6 HOURS PRN
COMMUNITY
Start: 2022-12-05 | End: 2023-02-10

## 2023-02-10 RX ORDER — TIZANIDINE 4 MG/1
4 TABLET ORAL NIGHTLY
COMMUNITY
Start: 2023-01-16

## 2023-02-10 RX ORDER — ASCORBIC ACID 500 MG
500 TABLET ORAL
COMMUNITY
End: 2023-02-10

## 2023-02-10 RX ORDER — TRAMADOL HYDROCHLORIDE 50 MG/1
50 TABLET ORAL EVERY 6 HOURS PRN
COMMUNITY
Start: 2023-01-18 | End: 2023-02-10

## 2023-02-10 RX ORDER — LISINOPRIL 10 MG/1
10 TABLET ORAL DAILY
Qty: 90 TABLET | Refills: 3 | Status: SHIPPED | OUTPATIENT
Start: 2023-02-10

## 2023-02-10 RX ORDER — METHYLPREDNISOLONE 4 MG/1
TABLET ORAL
COMMUNITY
Start: 2023-01-16 | End: 2023-02-10

## 2023-02-10 NOTE — PROGRESS NOTES
Health Maintenance Due   Topic     Hepatitis C Screening  Consult pcp    Shingles Vaccine (1 of 2)  hx chicken pox. Notified pt can get vaccine at pharmacy    Colorectal Cancer Screening  Pending order    LDCT Lung Screen  Consult pcp    Abdominal Aortic Aneurysm Screening  Consult pcp

## 2023-02-11 LAB
ALBUMIN SERPL BCP-MCNC: 3.5 G/DL (ref 3.5–5.2)
ALP SERPL-CCNC: 124 U/L (ref 55–135)
ALT SERPL W/O P-5'-P-CCNC: 12 U/L (ref 10–44)
ANION GAP SERPL CALC-SCNC: 14 MMOL/L (ref 8–16)
AST SERPL-CCNC: 21 U/L (ref 10–40)
BASOPHILS # BLD AUTO: 0.03 K/UL (ref 0–0.2)
BASOPHILS NFR BLD: 0.3 % (ref 0–1.9)
BILIRUB SERPL-MCNC: 0.3 MG/DL (ref 0.1–1)
BUN SERPL-MCNC: 13 MG/DL (ref 8–23)
CALCIUM SERPL-MCNC: 10.5 MG/DL (ref 8.7–10.5)
CHLORIDE SERPL-SCNC: 102 MMOL/L (ref 95–110)
CHOLEST SERPL-MCNC: 135 MG/DL (ref 120–199)
CHOLEST/HDLC SERPL: 3.6 {RATIO} (ref 2–5)
CO2 SERPL-SCNC: 24 MMOL/L (ref 23–29)
CREAT SERPL-MCNC: 1 MG/DL (ref 0.5–1.4)
DIFFERENTIAL METHOD: ABNORMAL
EOSINOPHIL # BLD AUTO: 0.1 K/UL (ref 0–0.5)
EOSINOPHIL NFR BLD: 0.8 % (ref 0–8)
ERYTHROCYTE [DISTWIDTH] IN BLOOD BY AUTOMATED COUNT: 16.9 % (ref 11.5–14.5)
EST. GFR  (NO RACE VARIABLE): >60 ML/MIN/1.73 M^2
ESTIMATED AVG GLUCOSE: 117 MG/DL (ref 68–131)
GLUCOSE SERPL-MCNC: 112 MG/DL (ref 70–110)
HBA1C MFR BLD: 5.7 % (ref 4–5.6)
HCT VFR BLD AUTO: 47.3 % (ref 40–54)
HDLC SERPL-MCNC: 37 MG/DL (ref 40–75)
HDLC SERPL: 27.4 % (ref 20–50)
HGB BLD-MCNC: 14.1 G/DL (ref 14–18)
IMM GRANULOCYTES # BLD AUTO: 0.03 K/UL (ref 0–0.04)
IMM GRANULOCYTES NFR BLD AUTO: 0.3 % (ref 0–0.5)
LDLC SERPL CALC-MCNC: 68 MG/DL (ref 63–159)
LYMPHOCYTES # BLD AUTO: 0.7 K/UL (ref 1–4.8)
LYMPHOCYTES NFR BLD: 6.7 % (ref 18–48)
MCH RBC QN AUTO: 25.6 PG (ref 27–31)
MCHC RBC AUTO-ENTMCNC: 29.8 G/DL (ref 32–36)
MCV RBC AUTO: 86 FL (ref 82–98)
MONOCYTES # BLD AUTO: 0.6 K/UL (ref 0.3–1)
MONOCYTES NFR BLD: 5.4 % (ref 4–15)
NEUTROPHILS # BLD AUTO: 9.3 K/UL (ref 1.8–7.7)
NEUTROPHILS NFR BLD: 86.5 % (ref 38–73)
NONHDLC SERPL-MCNC: 98 MG/DL
NRBC BLD-RTO: 0 /100 WBC
PLATELET # BLD AUTO: 275 K/UL (ref 150–450)
PMV BLD AUTO: 9.4 FL (ref 9.2–12.9)
POTASSIUM SERPL-SCNC: 4.7 MMOL/L (ref 3.5–5.1)
PROT SERPL-MCNC: 8 G/DL (ref 6–8.4)
RBC # BLD AUTO: 5.51 M/UL (ref 4.6–6.2)
SODIUM SERPL-SCNC: 140 MMOL/L (ref 136–145)
T4 FREE SERPL-MCNC: 0.88 NG/DL (ref 0.71–1.51)
TRIGL SERPL-MCNC: 150 MG/DL (ref 30–150)
TSH SERPL DL<=0.005 MIU/L-ACNC: 3.34 UIU/ML (ref 0.4–4)
WBC # BLD AUTO: 10.72 K/UL (ref 3.9–12.7)

## 2023-02-13 ENCOUNTER — TELEPHONE (OUTPATIENT)
Dept: HEMATOLOGY/ONCOLOGY | Facility: CLINIC | Age: 73
End: 2023-02-13
Payer: MEDICARE

## 2023-02-13 DIAGNOSIS — C85.90 LYMPHOMA, UNSPECIFIED BODY REGION, UNSPECIFIED LYMPHOMA TYPE: Primary | ICD-10-CM

## 2023-02-16 ENCOUNTER — PES CALL (OUTPATIENT)
Dept: ADMINISTRATIVE | Facility: CLINIC | Age: 73
End: 2023-02-16
Payer: MEDICARE

## 2023-02-17 ENCOUNTER — LAB VISIT (OUTPATIENT)
Dept: LAB | Facility: HOSPITAL | Age: 73
End: 2023-02-17
Payer: MEDICARE

## 2023-02-17 DIAGNOSIS — C85.90 LYMPHOMA, UNSPECIFIED BODY REGION, UNSPECIFIED LYMPHOMA TYPE: ICD-10-CM

## 2023-02-17 PROCEDURE — 88342 IMHCHEM/IMCYTCHM 1ST ANTB: CPT | Mod: 59 | Performed by: PATHOLOGY

## 2023-02-17 PROCEDURE — 88325 PR  COMPREHENSIVE REVIEW OF DATA: ICD-10-PCS | Mod: ,,, | Performed by: PATHOLOGY

## 2023-02-17 PROCEDURE — 88341 IMHCHEM/IMCYTCHM EA ADD ANTB: CPT | Mod: 59 | Performed by: PATHOLOGY

## 2023-02-17 PROCEDURE — 88325 CONSLTJ COMPRE RVW REC REPRT: CPT | Mod: ,,, | Performed by: PATHOLOGY

## 2023-02-17 PROCEDURE — 88325 CONSLTJ COMPRE RVW REC REPRT: CPT | Performed by: PATHOLOGY

## 2023-02-17 PROCEDURE — 88342 IMHCHEM/IMCYTCHM 1ST ANTB: CPT | Mod: 26,XU,GW, | Performed by: PATHOLOGY

## 2023-02-17 PROCEDURE — 88341 PR IHC OR ICC EACH ADD'L SINGLE ANTIBODY  STAINPR: ICD-10-PCS | Mod: 26,XU,GW, | Performed by: PATHOLOGY

## 2023-02-17 PROCEDURE — 88321 CONSLTJ&REPRT SLD PREP ELSWR: CPT | Performed by: PATHOLOGY

## 2023-02-17 PROCEDURE — 88341 IMHCHEM/IMCYTCHM EA ADD ANTB: CPT | Mod: 26,XU,GW, | Performed by: PATHOLOGY

## 2023-02-17 PROCEDURE — 88342 CHG IMMUNOCYTOCHEMISTRY: ICD-10-PCS | Mod: 26,XU,GW, | Performed by: PATHOLOGY

## 2023-02-23 LAB
COMMENT: NORMAL
FINAL PATHOLOGIC DIAGNOSIS: NORMAL
GROSS: NORMAL
Lab: NORMAL
MICROSCOPIC EXAM: NORMAL

## 2023-02-27 ENCOUNTER — TELEPHONE (OUTPATIENT)
Dept: FAMILY MEDICINE | Facility: CLINIC | Age: 73
End: 2023-02-27
Payer: MEDICARE

## 2023-02-27 NOTE — TELEPHONE ENCOUNTER
----- Message from Tripp Galvan MD sent at 2/23/2023 10:54 PM CST -----  Please contact the patient and let them know that their results were fine and do not require any change in treatment. He is slightly in prediabetes range but very slight. Would not make a change.

## 2023-03-08 NOTE — PROGRESS NOTES
Subjective:       Patient ID: Micheal Monroe is a 72 y.o. male.    Chief Complaint: Annual Exam      HPI  70-year-old male presents for annual exam.  Has history of lymphoma but does not want to follow with Oncology.  States he is not interested at this time.  States he is taking his other medications.        Review of Systems   Constitutional: Negative.    HENT: Negative.     Respiratory: Negative.     Cardiovascular: Negative.    Gastrointestinal: Negative.    Endocrine: Negative.    Genitourinary: Negative.    Musculoskeletal: Negative.    Neurological: Negative.    Psychiatric/Behavioral: Negative.          Past Medical History:   Diagnosis Date    Acid reflux     Anticoagulant long-term use     PLAVIX and ASPIRIN    Coronary artery disease     had angiogram--pt refused STENT placement    Diverticula of intestine     Hematuria     Hypercholesteremia     Hypertension     Kidney stone      Past Surgical History:   Procedure Laterality Date    EXTRACORPOREAL SHOCK WAVE LITHOTRIPSY      x 4 episodes in past    EYE SURGERY      removal of kidney stones      with nephrostomy tube in place for awhile     History reviewed. No pertinent family history.  Social History     Socioeconomic History    Marital status: Single   Tobacco Use    Smoking status: Every Day     Packs/day: 0.50     Years: 40.00     Pack years: 20.00     Types: Cigarettes    Smokeless tobacco: Never   Substance and Sexual Activity    Alcohol use: No    Drug use: No    Sexual activity: Yes       Current Outpatient Medications:     ascorbic acid, vitamin C, (VITAMIN C) 1000 MG tablet, Take 1,000 mg by mouth., Disp: , Rfl:     aspirin 81 MG Chew, Take 81 mg by mouth every evening., Disp: , Rfl:     coenzyme Q10 100 mg capsule, Take 100 mg by mouth., Disp: , Rfl:     cyanocobalamin 500 MCG tablet, Take 500 mcg by mouth., Disp: , Rfl:     cyclobenzaprine (FLEXERIL) 10 MG tablet, Take 10 mg by mouth., Disp: , Rfl:     dandelion root 525 mg Cap, Take 1 capsule  "by mouth., Disp: , Rfl:     HYDROcodone-acetaminophen (NORCO) 5-325 mg per tablet, Take by mouth., Disp: , Rfl:     mupirocin (BACTROBAN) 2 % ointment, Apply 1 application topically., Disp: , Rfl:     pantoprazole (PROTONIX) 40 MG tablet, Take 40 mg by mouth nightly. , Disp: , Rfl:     tiZANidine (ZANAFLEX) 4 MG tablet, Take 4 mg by mouth every evening., Disp: , Rfl:     ascorbic acid, vitamin C, (VITAMIN C) 500 MG tablet, Take 500 mg by mouth once daily., Disp: , Rfl:     aspirin (ECOTRIN) 81 MG EC tablet, Take 81 mg by mouth., Disp: , Rfl:     clopidogreL (PLAVIX) 75 mg tablet, Take 1 tablet (75 mg total) by mouth once daily., Disp: 90 tablet, Rfl: 3    coenzyme Q10 100 mg capsule, Take 100 mg by mouth once daily. , Disp: , Rfl: 0    cyanocobalamin (VITAMIN B-12) 1000 MCG tablet, Take 1 tablet by mouth every other day., Disp: , Rfl:     cyanocobalamin 500 MCG tablet, Take 500 mcg by mouth., Disp: , Rfl:     levothyroxine (SYNTHROID) 50 MCG tablet, Take 1 tablet (50 mcg total) by mouth before breakfast., Disp: 90 tablet, Rfl: 3    lisinopriL 10 MG tablet, Take 1 tablet (10 mg total) by mouth once daily., Disp: 90 tablet, Rfl: 3    rosuvastatin (CRESTOR) 10 MG tablet, Take 1 tablet (10 mg total) by mouth every evening., Disp: 90 tablet, Rfl: 3   Objective:      Vitals:    02/10/23 1402   BP: 138/84   BP Location: Left arm   Patient Position: Sitting   BP Method: Small (Manual)   Pulse: (!) 113   Resp: 18   Temp: 98.3 °F (36.8 °C)   TempSrc: Oral   SpO2: 96%   Weight: 62.3 kg (137 lb 5.6 oz)   Height: 5' 3" (1.6 m)       Physical Exam  Constitutional:       General: He is not in acute distress.  HENT:      Head: Normocephalic and atraumatic.   Eyes:      Conjunctiva/sclera: Conjunctivae normal.   Cardiovascular:      Rate and Rhythm: Normal rate and regular rhythm.      Heart sounds: Normal heart sounds. No murmur heard.    No friction rub. No gallop.   Pulmonary:      Effort: Pulmonary effort is normal.      Breath " sounds: Normal breath sounds. No wheezing or rales.   Musculoskeletal:      Cervical back: Neck supple.   Skin:     General: Skin is warm and dry.   Neurological:      Mental Status: He is alert and oriented to person, place, and time.   Psychiatric:         Behavior: Behavior normal.         Thought Content: Thought content normal.         Judgment: Judgment normal.          Assessment:       1. Annual physical exam    2. Encounter for colorectal cancer screening    3. Coronary artery disease involving native coronary artery of native heart with angina pectoris    4. Chronic bilateral low back pain, unspecified whether sciatica present    5. Primary hypertension    6. Pure hypercholesterolemia    7. Hypothyroidism, unspecified type    8. Abnormal finding of blood chemistry, unspecified    9. Compression fracture of T6 vertebra with routine healing, subsequent encounter    10. Lymphoma, unspecified body region, unspecified lymphoma type    11. PVD (peripheral vascular disease)    12. Chronic obstructive pulmonary disease, unspecified COPD type          Plan:       Annual physical exam    Encounter for colorectal cancer screening  -     Ambulatory referral/consult to Endo Procedure ; Future; Expected date: 02/11/2023    Coronary artery disease involving native coronary artery of native heart with angina pectoris  -     CBC Auto Differential; Future; Expected date: 02/10/2023  -     Comprehensive Metabolic Panel; Future; Expected date: 02/10/2023  -     Hemoglobin A1C; Future; Expected date: 02/10/2023  -     TSH; Future; Expected date: 02/10/2023  -     Lipid Panel; Future; Expected date: 02/10/2023  -     clopidogreL (PLAVIX) 75 mg tablet; Take 1 tablet (75 mg total) by mouth once daily.  Dispense: 90 tablet; Refill: 3  -     rosuvastatin (CRESTOR) 10 MG tablet; Take 1 tablet (10 mg total) by mouth every evening.  Dispense: 90 tablet; Refill: 3  -     lisinopriL 10 MG tablet; Take 1 tablet (10 mg total) by  mouth once daily.  Dispense: 90 tablet; Refill: 3    Chronic bilateral low back pain, unspecified whether sciatica present  -     Ambulatory referral/consult to Pain Clinic; Future; Expected date: 02/17/2023  -     [facility]; Future; Expected date: 02/17/2023    Primary hypertension  -     CBC Auto Differential; Future; Expected date: 02/10/2023  -     Comprehensive Metabolic Panel; Future; Expected date: 02/10/2023    Pure hypercholesterolemia  -     Lipid Panel; Future; Expected date: 02/10/2023    Hypothyroidism, unspecified type  -     TSH; Future; Expected date: 02/10/2023  -     T4, FREE; Future; Expected date: 02/10/2023  -     levothyroxine (SYNTHROID) 50 MCG tablet; Take 1 tablet (50 mcg total) by mouth before breakfast.  Dispense: 90 tablet; Refill: 3    Abnormal finding of blood chemistry, unspecified  -     Hemoglobin A1C; Future; Expected date: 02/10/2023    Compression fracture of T6 vertebra with routine healing, subsequent encounter  -     Ambulatory referral/consult to Pain Clinic; Future; Expected date: 02/17/2023    Lymphoma, unspecified body region, unspecified lymphoma type  -     Ambulatory referral/consult to Oncology; Future; Expected date: 02/17/2023  -     [facility]; Future; Expected date: 02/17/2023    PVD (peripheral vascular disease)    Chronic obstructive pulmonary disease, unspecified COPD type    Continue meds.  Place referral to Oncology along with palliative care.  Patient is not interested in treatment for his lymphoma but was advised to discuss with Oncology.  Continue other medications          Future Appointments   Date Time Provider Department Center   3/23/2023  2:30 PM Emi Stearns NP [facility] NEUROS8 [facility]   4/12/2023  1:20 PM MD DENA [facility] MED [place]       Patient note was created using MModal.  Any errors in syntax or even  information may not have been identified and edited on initial review prior to signing this note.

## 2023-03-30 ENCOUNTER — TELEPHONE (OUTPATIENT)
Dept: SPINE | Facility: CLINIC | Age: 73
End: 2023-03-30
Payer: MEDICARE

## 2023-05-10 ENCOUNTER — TELEPHONE (OUTPATIENT)
Dept: FAMILY MEDICINE | Facility: CLINIC | Age: 73
End: 2023-05-10
Payer: MEDICARE

## 2023-05-10 DIAGNOSIS — Z12.11 COLON CANCER SCREENING: Primary | ICD-10-CM

## 2023-05-10 NOTE — TELEPHONE ENCOUNTER
----- Message from Bella Durham sent at 5/9/2023  6:19 PM CDT -----  Contact: [phone]  Patient is in need of a new colonoscopy referral due to expiration date needing to be pushed further out. Thanks

## 2023-05-20 ENCOUNTER — HOSPITAL ENCOUNTER (EMERGENCY)
Facility: HOSPITAL | Age: 73
Discharge: LEFT BEFORE DISCHARGE | End: 2023-05-21
Payer: MEDICARE

## 2023-05-20 ENCOUNTER — HOSPITAL ENCOUNTER (EMERGENCY)
Facility: HOSPITAL | Age: 73
Discharge: LEFT BEFORE DISCHARGE | End: 2023-05-21
Attending: EMERGENCY MEDICINE | Admitting: EMERGENCY MEDICINE
Payer: MEDICARE

## 2023-05-20 VITALS
OXYGEN SATURATION: 97 % | SYSTOLIC BLOOD PRESSURE: 128 MMHG | TEMPERATURE: 99 F | RESPIRATION RATE: 16 BRPM | HEART RATE: 100 BPM | DIASTOLIC BLOOD PRESSURE: 77 MMHG

## 2023-05-20 DIAGNOSIS — G83.9: Primary | ICD-10-CM
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
## 2023-05-20 DIAGNOSIS — C79.51 METASTATIC CANCER TO SPINE: ICD-10-CM

## 2023-05-20 DIAGNOSIS — R29.898 WEAKNESS OF LOWER EXTREMITY, UNSPECIFIED LATERALITY: ICD-10-CM

## 2023-05-20 LAB
ALBUMIN SERPL BCP-MCNC: 2.8 G/DL (ref 3.5–5.2)
ALP SERPL-CCNC: 75 U/L (ref 55–135)
ALT SERPL W/O P-5'-P-CCNC: 12 U/L (ref 10–44)
AMPHET+METHAMPHET UR QL: NEGATIVE
ANION GAP SERPL CALC-SCNC: 13 MMOL/L (ref 8–16)
AST SERPL-CCNC: 25 U/L (ref 10–40)
BARBITURATES UR QL SCN>200 NG/ML: NEGATIVE
BASOPHILS # BLD AUTO: 0.02 K/UL (ref 0–0.2)
BASOPHILS NFR BLD: 0.2 % (ref 0–1.9)
BENZODIAZ UR QL SCN>200 NG/ML: NEGATIVE
BILIRUB SERPL-MCNC: 0.4 MG/DL (ref 0.1–1)
BUN SERPL-MCNC: 18 MG/DL (ref 8–23)
BZE UR QL SCN: NEGATIVE
CALCIUM SERPL-MCNC: 10.2 MG/DL (ref 8.7–10.5)
CANNABINOIDS UR QL SCN: NEGATIVE
CHLORIDE SERPL-SCNC: 100 MMOL/L (ref 95–110)
CK SERPL-CCNC: 25 U/L (ref 20–200)
CO2 SERPL-SCNC: 25 MMOL/L (ref 23–29)
CREAT SERPL-MCNC: 1.1 MG/DL (ref 0.5–1.4)
CREAT UR-MCNC: 63.4 MG/DL (ref 23–375)
DIFFERENTIAL METHOD: ABNORMAL
EOSINOPHIL # BLD AUTO: 0.1 K/UL (ref 0–0.5)
EOSINOPHIL NFR BLD: 1.2 % (ref 0–8)
ERYTHROCYTE [DISTWIDTH] IN BLOOD BY AUTOMATED COUNT: 16.3 % (ref 11.5–14.5)
EST. GFR  (NO RACE VARIABLE): >60 ML/MIN/1.73 M^2
ETHANOL SERPL-MCNC: <10 MG/DL
GLUCOSE SERPL-MCNC: 105 MG/DL (ref 70–110)
HCT VFR BLD AUTO: 36.4 % (ref 40–54)
HGB BLD-MCNC: 12 G/DL (ref 14–18)
IMM GRANULOCYTES # BLD AUTO: 0.06 K/UL (ref 0–0.04)
IMM GRANULOCYTES NFR BLD AUTO: 0.6 % (ref 0–0.5)
LYMPHOCYTES # BLD AUTO: 0.9 K/UL (ref 1–4.8)
LYMPHOCYTES NFR BLD: 8.3 % (ref 18–48)
MAGNESIUM SERPL-MCNC: 1.8 MG/DL (ref 1.6–2.6)
MCH RBC QN AUTO: 25.6 PG (ref 27–31)
MCHC RBC AUTO-ENTMCNC: 33 G/DL (ref 32–36)
MCV RBC AUTO: 78 FL (ref 82–98)
METHADONE UR QL SCN>300 NG/ML: NEGATIVE
MONOCYTES # BLD AUTO: 0.7 K/UL (ref 0.3–1)
MONOCYTES NFR BLD: 7.2 % (ref 4–15)
NEUTROPHILS # BLD AUTO: 8.4 K/UL (ref 1.8–7.7)
NEUTROPHILS NFR BLD: 82.5 % (ref 38–73)
NRBC BLD-RTO: 0 /100 WBC
OPIATES UR QL SCN: ABNORMAL
PCP UR QL SCN>25 NG/ML: NEGATIVE
PHOSPHATE SERPL-MCNC: 4.5 MG/DL (ref 2.7–4.5)
PLATELET # BLD AUTO: 296 K/UL (ref 150–450)
PMV BLD AUTO: 9.2 FL (ref 9.2–12.9)
POTASSIUM SERPL-SCNC: 4.2 MMOL/L (ref 3.5–5.1)
PROT SERPL-MCNC: 6.9 G/DL (ref 6–8.4)
RBC # BLD AUTO: 4.68 M/UL (ref 4.6–6.2)
SODIUM SERPL-SCNC: 138 MMOL/L (ref 136–145)
TOXICOLOGY INFORMATION: ABNORMAL
TSH SERPL DL<=0.005 MIU/L-ACNC: 2.65 UIU/ML (ref 0.4–4)
WBC # BLD AUTO: 10.19 K/UL (ref 3.9–12.7)

## 2023-05-20 PROCEDURE — 82550 ASSAY OF CK (CPK): CPT | Performed by: EMERGENCY MEDICINE

## 2023-05-20 PROCEDURE — 80053 COMPREHEN METABOLIC PANEL: CPT | Performed by: EMERGENCY MEDICINE

## 2023-05-20 PROCEDURE — 99281 EMR DPT VST MAYX REQ PHY/QHP: CPT | Mod: 25,27

## 2023-05-20 PROCEDURE — 82077 ASSAY SPEC XCP UR&BREATH IA: CPT | Performed by: EMERGENCY MEDICINE

## 2023-05-20 PROCEDURE — 99284 EMERGENCY DEPT VISIT MOD MDM: CPT | Mod: 25

## 2023-05-20 PROCEDURE — 99285 EMERGENCY DEPT VISIT HI MDM: CPT | Mod: GW,,, | Performed by: EMERGENCY MEDICINE

## 2023-05-20 PROCEDURE — 25000003 PHARM REV CODE 250: Performed by: EMERGENCY MEDICINE

## 2023-05-20 PROCEDURE — 84443 ASSAY THYROID STIM HORMONE: CPT | Performed by: EMERGENCY MEDICINE

## 2023-05-20 PROCEDURE — 83735 ASSAY OF MAGNESIUM: CPT | Performed by: EMERGENCY MEDICINE

## 2023-05-20 PROCEDURE — 99499 UNLISTED E&M SERVICE: CPT | Mod: ,,, | Performed by: EMERGENCY MEDICINE

## 2023-05-20 PROCEDURE — 99499 NO LOS: ICD-10-PCS | Mod: ,,, | Performed by: EMERGENCY MEDICINE

## 2023-05-20 PROCEDURE — 96374 THER/PROPH/DIAG INJ IV PUSH: CPT

## 2023-05-20 PROCEDURE — 96376 TX/PRO/DX INJ SAME DRUG ADON: CPT

## 2023-05-20 PROCEDURE — 99285 PR EMERGENCY DEPT VISIT,LEVEL V: ICD-10-PCS | Mod: GW,,, | Performed by: EMERGENCY MEDICINE

## 2023-05-20 PROCEDURE — 96372 THER/PROPH/DIAG INJ SC/IM: CPT | Mod: 59 | Performed by: EMERGENCY MEDICINE

## 2023-05-20 PROCEDURE — 84100 ASSAY OF PHOSPHORUS: CPT | Performed by: EMERGENCY MEDICINE

## 2023-05-20 PROCEDURE — 85025 COMPLETE CBC W/AUTO DIFF WBC: CPT | Performed by: EMERGENCY MEDICINE

## 2023-05-20 PROCEDURE — 80307 DRUG TEST PRSMV CHEM ANLYZR: CPT | Performed by: EMERGENCY MEDICINE

## 2023-05-20 PROCEDURE — 63600175 PHARM REV CODE 636 W HCPCS: Performed by: EMERGENCY MEDICINE

## 2023-05-20 PROCEDURE — 96361 HYDRATE IV INFUSION ADD-ON: CPT

## 2023-05-20 RX ORDER — ORPHENADRINE CITRATE 30 MG/ML
60 INJECTION INTRAMUSCULAR; INTRAVENOUS
Status: COMPLETED | OUTPATIENT
Start: 2023-05-20 | End: 2023-05-20

## 2023-05-20 RX ORDER — HYDROMORPHONE HYDROCHLORIDE 1 MG/ML
1 INJECTION, SOLUTION INTRAMUSCULAR; INTRAVENOUS; SUBCUTANEOUS
Status: COMPLETED | OUTPATIENT
Start: 2023-05-20 | End: 2023-05-20

## 2023-05-20 RX ADMIN — SODIUM CHLORIDE 1000 ML: 0.9 INJECTION, SOLUTION INTRAVENOUS at 10:05

## 2023-05-20 RX ADMIN — SODIUM CHLORIDE 1000 ML: 9 INJECTION, SOLUTION INTRAVENOUS at 05:05

## 2023-05-20 RX ADMIN — HYDROMORPHONE HYDROCHLORIDE 1 MG: 1 INJECTION, SOLUTION INTRAMUSCULAR; INTRAVENOUS; SUBCUTANEOUS at 06:05

## 2023-05-20 RX ADMIN — ORPHENADRINE CITRATE 60 MG: 60 INJECTION INTRAMUSCULAR; INTRAVENOUS at 05:05

## 2023-05-20 RX ADMIN — HYDROMORPHONE HYDROCHLORIDE 1 MG: 1 INJECTION, SOLUTION INTRAMUSCULAR; INTRAVENOUS; SUBCUTANEOUS at 05:05

## 2023-05-20 NOTE — ED NOTES
Preexisting you catheter removed from pt, 10mL of fluid removed from balloon prior to you removal. New you catheter placed with sterile technique maintained and positive urine flow.

## 2023-05-20 NOTE — ED PROVIDER NOTES
"Encounter Date: 5/20/2023       History     Chief Complaint   Patient presents with    Fall     Pt to ED via EMS reporting fall 2 days ago after tripping over his cat. Pt c/o lower back pain, decrease in urination, and hasn't had a BM since. Pt reports he has a friend who is a nurse that placed a you this morning. Pt reports he began having severe leg pain but now states "my legs feel dead, I only feel pressure". Pt takes Plavix.      72 y.o. male Past Medical History:  No date: Acid reflux  No date: Anticoagulant long-term use      Comment:  PLAVIX and ASPIRIN  No date: Coronary artery disease      Comment:  had angiogram--pt refused STENT placement  No date: Diverticula of intestine  No date: Hematuria  No date: Hypercholesteremia  No date: Hypertension  No date: Kidney stone     Pt was diagnosed with lymphoma 3.5 yrs ago. States that he refused chemo/radiation.  Presents for evaluation of chronic back pain, on plavix, endorses mechanical slip/fall 2 days ago after his pet cat ran through his legs. Endorses inability to urinate or defecate since fall. Pt reports enema done by home health RN with resolution. Also notes urinary retention requiring you being placed. states that his "legs feel dead" and he "cannot move them". Pt reports hx of metastatic lymphoma which has not been treated.      3/25/21 noted by Heme Onc Dr. Rowland in care everywhere    "Mr. Monroe returns today to review the results of his PET scan and to make a decision upon therapy of his recently diagnosed lymphoma. A core biopsy of a bone lesion revealed a low-grade B-cell lymphoma. 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 A PET scan done to assess disease revealed extensive lymphadenopathy throughout the entire body as well as extensive osseous lesions in the axilla and appendicular skeleton. I am at this time recommending systemic chemotherapy and consideration of radiation therapy to the left hip. I " reviewed with the patient the pathology report and PET scan results and explained the natural history of this low-grade lymphoproliferative disorder. It is symptomatic and at a point where he is at risk of having end-organ damage. I explained the importance of therapy to controlling this disease before it causes any significant clinical problems. I further explained that this was a disease that we had good treatments for and that he had a high probability of having a clinically meaningful and durable remission. The patient has pain in the left hip at the site of the initial identified lesion but he reports he has had this pain for 25 years and it has not gotten worse. The patient seems confused about some facets of his history and had a difficult time with decision making today. [facility] is [facility] getting treated [facility] for cost purposes. While this is reasonable I think he would be better served here in terms axis to care and the availability of hospital services should there be a problem. We discussed Port-A-Cath placement, chemotherapy and the toxicity profile of treatment. The patient reports that he will let me know his decision on where he wants to get treated in 2 weeks. I will be very aggressive in following up on this with him. He has shotty lymphadenopathy but denies fever, chills, night sweats or any infectious morbidity. He has had his coronavirus vaccination. His weight is stable and in fact he reports he is gaining weight at this time.     Impression and Plan   1. 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 There is extensive involvement of the bones, lymph nodes and bone marrow. With symptomatic disease threatening end organ damage today I recommended systemic therapy. Options for this disease include Bendamustine and Rituxan (BR), R-CHOP, R-CVP for single agent Rituxan. Radiation to  "the left proximal femur should be considered and if the patient elects to get his care here I will refer him to Dr. Heart. The patient is [facility] and once a couple of weeks to decide where he would like to be treated. He plans to call me back in 2 weeks with a decision, see below.  2. Hypertension under medical management."            Review of patient's allergies indicates:   Allergen Reactions    Ciprofloxacin Other (See Comments)     "really dark thoughts, suicidal thoughts"    Levofloxacin Nausea And Vomiting and Other (See Comments)     Past Medical History:   Diagnosis Date    Acid reflux     Anticoagulant long-term use     PLAVIX and ASPIRIN    Coronary artery disease     had angiogram--pt refused STENT placement    Diverticula of intestine     Hematuria     Hypercholesteremia     Hypertension     Kidney stone      Past Surgical History:   Procedure Laterality Date    EXTRACORPOREAL SHOCK WAVE LITHOTRIPSY      x 4 episodes in past    EYE SURGERY      removal of kidney stones      with nephrostomy tube in place for awhile     History reviewed. No pertinent family history.  Social History     Tobacco Use    Smoking status: Every Day     Packs/day: 0.50     Years: 40.00     Pack years: 20.00     Types: Cigarettes    Smokeless tobacco: Never   Substance Use Topics    Alcohol use: No    Drug use: No     Review of Systems   Constitutional:  Negative for fever.   HENT:  Negative for sore throat.    Respiratory:  Negative for shortness of breath.    Cardiovascular:  Negative for chest pain.   Gastrointestinal:  Negative for nausea.   Genitourinary:  Negative for dysuria.   Musculoskeletal:  Negative for back pain.   Skin:  Negative for rash.   Neurological:  Negative for weakness.   Hematological:  Does not bruise/bleed easily.   All other systems reviewed and are negative.    Physical Exam     Initial Vitals   BP Pulse Resp Temp SpO2   05/20/23 1646 05/20/23 1646 05/20/23 1646 05/20/23 " 1646 05/20/23 1706   (!) 148/80 (!) 118 18 99 °F (37.2 °C) 96 %      MAP       --                Physical Exam    Nursing note and vitals reviewed.  Constitutional: He appears well-developed and well-nourished.   HENT:   Head: Normocephalic and atraumatic.   Eyes: EOM are normal. Pupils are equal, round, and reactive to light.   Cardiovascular:  Normal rate and regular rhythm.           Pulmonary/Chest: Effort normal. No respiratory distress.   Abdominal: He exhibits no distension.   Musculoskeletal:         General: No tenderness or edema.     Neurological: He is alert and oriented to person, place, and time. No cranial nerve deficit.   Skin: Skin is warm and dry.   Psychiatric: He has a normal mood and affect.   5/5 str b/l UE  Pt essentially ttp up entire spine midline.  Babinski or pain test cause pt to remove leg at knee  Rectal: good tone, intact sensation.  ED Course   Procedures  MEDICAL DECISION MAKING    After review of the patient's physical exam, ED testing, and history/symptoms, relevant labs, imaging, available outside records  a wide differential was considered including but not limited to: infectious, traumatic, vascular, toxicological , metabolic, malignant, ischemic, embolic, psychological, genetic, iatrogenic, idiopathic, medication reaction, substance dependence/intoxication/withdrawal, electrolyte or blood dyscrasia, and other etiologies.        labs/imaging/interventions include:       Medications   orphenadrine injection 60 mg (60 mg Intramuscular Given 5/20/23 1741)   HYDROmorphone injection 1 mg (1 mg Intravenous Given 5/20/23 1739)   sodium chloride 0.9% bolus 1,000 mL 1,000 mL (1,000 mLs Intravenous New Bag 5/20/23 1741)   HYDROmorphone injection 1 mg (1 mg Intravenous Given 5/20/23 1850)     Labs Reviewed   CBC W/ AUTO DIFFERENTIAL - Abnormal; Notable for the following components:       Result Value    Hemoglobin 12.0 (*)     Hematocrit 36.4 (*)     MCV 78 (*)     MCH 25.6 (*)     RDW  16.3 (*)     Immature Granulocytes 0.6 (*)     Gran # (ANC) 8.4 (*)     Immature Grans (Abs) 0.06 (*)     Lymph # 0.9 (*)     Gran % 82.5 (*)     Lymph % 8.3 (*)     All other components within normal limits   COMPREHENSIVE METABOLIC PANEL - Abnormal; Notable for the following components:    Albumin 2.8 (*)     All other components within normal limits   DRUG SCREEN PANEL, URINE EMERGENCY - Abnormal; Notable for the following components:    Opiate Scrn, Ur Presumptive Positive (*)     All other components within normal limits    Narrative:     Specimen Source->Urine   TSH   MAGNESIUM   PHOSPHORUS   ALCOHOL,MEDICAL (ETHANOL)   CK      MRI Lumbar Spine Without Contrast   Final Result   Abnormal      Diffuse osseous metastatic disease throughout the thoracic spine, lumbar spine, and sacrum with multilevel soft tissue tumor extension.  Mild pathologic compression fractures are visualized at T5, T6, T8, T10, and L1.  Soft tissue extension of tumor is visualized extending into the spinal canal at multiple levels including the T5-6, T8, and T10 levels resulting in at least moderate to severe spinal canal stenosis.  Abnormal cord signal is visualized at T10.      This report was flagged in Epic as abnormal.         Electronically signed by: Karina Urias MD   Date:    05/20/2023   Time:    18:53      MRI Thoracic Spine Without Contrast   Final Result   Abnormal      Diffuse osseous metastatic disease throughout the thoracic spine, lumbar spine, and sacrum with multilevel soft tissue tumor extension.  Mild pathologic compression fractures are visualized at T5, T6, T8, T10, and L1.  Soft tissue extension of tumor is visualized extending into the spinal canal at multiple levels including the T5-6, T8, and T10 levels resulting in at least moderate to severe spinal canal stenosis.  Abnormal cord signal is visualized at T10.      This report was flagged in Epic as abnormal.         Electronically signed by: Karina Urias MD    Date:    05/20/2023   Time:    18:53      CT Cervical Spine Without Contrast   Final Result   Addendum (preliminary) 1 of 1      Abnormal lucency described within the C5 vertebral body would be    consistent with metastatic disease given findings on subsequent thoracic    and lumbar spine MRI.  Additional lucencies consistent with metastatic    disease also visualized involving C2 and the partially visualized T3    vertebral bodies.         Electronically signed by: Karina Urias MD   Date:    05/20/2023   Time:    18:55      Final      No evidence of acute cervical spine fracture or dislocation.         Electronically signed by: Karina Urias MD   Date:    05/20/2023   Time:    17:42      CT Head Without Contrast   Final Result      No acute intracranial abnormalities identified.         Electronically signed by: Karina Urias MD   Date:    05/20/2023   Time:    17:39            Labs Reviewed   CBC W/ AUTO DIFFERENTIAL - Abnormal; Notable for the following components:       Result Value    Hemoglobin 12.0 (*)     Hematocrit 36.4 (*)     MCV 78 (*)     MCH 25.6 (*)     RDW 16.3 (*)     Immature Granulocytes 0.6 (*)     Gran # (ANC) 8.4 (*)     Immature Grans (Abs) 0.06 (*)     Lymph # 0.9 (*)     Gran % 82.5 (*)     Lymph % 8.3 (*)     All other components within normal limits   COMPREHENSIVE METABOLIC PANEL - Abnormal; Notable for the following components:    Albumin 2.8 (*)     All other components within normal limits   DRUG SCREEN PANEL, URINE EMERGENCY - Abnormal; Notable for the following components:    Opiate Scrn, Ur Presumptive Positive (*)     All other components within normal limits    Narrative:     Specimen Source->Urine   TSH   MAGNESIUM   PHOSPHORUS   ALCOHOL,MEDICAL (ETHANOL)   CK          Imaging Results               MRI Lumbar Spine Without Contrast (Final result)  Result time 05/20/23 18:53:06      Final result by Karina Urias MD (05/20/23 18:53:06)                   Impression:       Diffuse osseous metastatic disease throughout the thoracic spine, lumbar spine, and sacrum with multilevel soft tissue tumor extension.  Mild pathologic compression fractures are visualized at T5, T6, T8, T10, and L1.  Soft tissue extension of tumor is visualized extending into the spinal canal at multiple levels including the T5-6, T8, and T10 levels resulting in at least moderate to severe spinal canal stenosis.  Abnormal cord signal is visualized at T10.    This report was flagged in Epic as abnormal.      Electronically signed by: Karina Urias MD  Date:    05/20/2023  Time:    18:53               Narrative:    EXAMINATION:  MRI THORACIC SPINE WITHOUT CONTRAST; MRI LUMBAR SPINE WITHOUT CONTRAST    CLINICAL HISTORY:  Back trauma, abnormal neuro exam, CT or xray positive (Age >= 16y);    TECHNIQUE:  Multiplanar, multisequence images were performed of the thoracic spine and lumbar spine.  Contrast was not administered.    COMPARISON:  None    FINDINGS:  There is diffuse osseous metastatic disease seen throughout the thoracic and lumbar spine and visualized sacrum.  Mild compression fractures are visualized at the T5, T6, T8, T10, and L1 vertebral bodies.  There is retropulsion suspected soft tissue extension of tumor seen into the spinal canal at the T5-6, T8, and T10 levels which result in moderate to severe spinal canal stenosis.  Abnormal focal cord signal is visualized at the T10 level.  Multilevel suspected soft tissue tumor also seen within the paraspinous musculature of the thoracic spine, most pronounced on the right at the T10 level.  Conus terminates at the L1 level.                                        MRI Thoracic Spine Without Contrast (Final result)  Result time 05/20/23 18:53:06      Final result by Karina Urias MD (05/20/23 18:53:06)                   Impression:      Diffuse osseous metastatic disease throughout the thoracic spine, lumbar spine, and sacrum with multilevel soft tissue  tumor extension.  Mild pathologic compression fractures are visualized at T5, T6, T8, T10, and L1.  Soft tissue extension of tumor is visualized extending into the spinal canal at multiple levels including the T5-6, T8, and T10 levels resulting in at least moderate to severe spinal canal stenosis.  Abnormal cord signal is visualized at T10.    This report was flagged in Epic as abnormal.      Electronically signed by: Karina Urias MD  Date:    05/20/2023  Time:    18:53               Narrative:    EXAMINATION:  MRI THORACIC SPINE WITHOUT CONTRAST; MRI LUMBAR SPINE WITHOUT CONTRAST    CLINICAL HISTORY:  Back trauma, abnormal neuro exam, CT or xray positive (Age >= 16y);    TECHNIQUE:  Multiplanar, multisequence images were performed of the thoracic spine and lumbar spine.  Contrast was not administered.    COMPARISON:  None    FINDINGS:  There is diffuse osseous metastatic disease seen throughout the thoracic and lumbar spine and visualized sacrum.  Mild compression fractures are visualized at the T5, T6, T8, T10, and L1 vertebral bodies.  There is retropulsion suspected soft tissue extension of tumor seen into the spinal canal at the T5-6, T8, and T10 levels which result in moderate to severe spinal canal stenosis.  Abnormal focal cord signal is visualized at the T10 level.  Multilevel suspected soft tissue tumor also seen within the paraspinous musculature of the thoracic spine, most pronounced on the right at the T10 level.  Conus terminates at the L1 level.                                       CT Cervical Spine Without Contrast (Edited Result - FINAL)  Result time 05/20/23 18:55:15      Addendum (preliminary) 1 of 1 by Karina Urias MD (05/20/23 18:55:15)      Abnormal lucency described within the C5 vertebral body would be consistent with metastatic disease given findings on subsequent thoracic and lumbar spine MRI.  Additional lucencies consistent with metastatic disease also visualized involving C2 and  the partially visualized T3 vertebral bodies.      Electronically signed by: Karina Urias MD  Date:    05/20/2023  Time:    18:55                   Final result by Karina Urias MD (05/20/23 17:42:29)                   Impression:      No evidence of acute cervical spine fracture or dislocation.      Electronically signed by: Karina Urias MD  Date:    05/20/2023  Time:    17:42               Narrative:    EXAMINATION:  CT CERVICAL SPINE WITHOUT CONTRAST    CLINICAL HISTORY:  Neck trauma (Age >= 65y);    TECHNIQUE:  Low dose axial images, sagittal and coronal reformations were performed though the cervical spine.  Contrast was not administered.    COMPARISON:  None    FINDINGS:  No evidence of acute cervical spine fracture or dislocation.  Odontoid process is intact.  Craniocervical junction is unremarkable.  Cervical spine alignment is within normal limits.  Intervertebral disc space narrowing and anterior degenerative spurring are seen at the C5-6 and C6-7 levels.  Nonspecific lucency is seen within the C5 vertebral body.    Surrounding soft tissues show no significant abnormalities.  Airway is patent.  Partially visualized lung apices are clear.                                       CT Head Without Contrast (Final result)  Result time 05/20/23 17:39:41      Final result by Karina Urias MD (05/20/23 17:39:41)                   Impression:      No acute intracranial abnormalities identified.      Electronically signed by: Karina Urias MD  Date:    05/20/2023  Time:    17:39               Narrative:    EXAMINATION:  CT HEAD WITHOUT CONTRAST    CLINICAL HISTORY:  Head trauma, minor (Age >= 65y);trauma;    TECHNIQUE:  Low dose axial images were obtained through the head.  Coronal and sagittal reformations were also performed. Contrast was not administered.    COMPARISON:  None.    FINDINGS:  No evidence of acute/recent major vascular distribution cerebral infarction, intraparenchymal hemorrhage, or  "intra-axial space occupying lesion. The ventricular system is normal in size and configuration with no evidence of hydrocephalus. No effacement of the skull-base cisterns. No abnormal extra-axial fluid collections or blood products. Visualized paranasal sinuses and mastoid air cells are clear. The calvarium shows no significant abnormality.                                       Medications   orphenadrine injection 60 mg (60 mg Intramuscular Given 5/20/23 1741)   HYDROmorphone injection 1 mg (1 mg Intravenous Given 5/20/23 1739)   sodium chloride 0.9% bolus 1,000 mL 1,000 mL (1,000 mLs Intravenous New Bag 5/20/23 1741)   HYDROmorphone injection 1 mg (1 mg Intravenous Given 5/20/23 1850)         This is a complicated presentation. The patient's exam is inconsistent. Have ordered ct head/cspine and MRI t/l spine. Have d/w Dr. Conroy from neuro who agrees with workup.             Patient brought back from MRI. Patient refusing MRI. States that "they can do it tomorrow" and he "is in too much pain" have d/w pt that we can give him pain meds but he 100% needs the MRI.    Pt refuses MRI again, states that he "won't do it", and if we  Send him there "he will move all around".    He continues to refuse MRI and states he will just "come back another day". I have explained to the patient his complaints are concerning a surgical emergency.    Pt now demanding food/water. Have explained that we cannot give him food/water but I have ordered ivf for him.    Pt states he is "in too much pain". I have explained to him that pain meds/spasm meds have been ordered and will be given to him prior to the MRI however we will not bargain a narcotic pain regimen with him to "buy" his cooperation with needed imaging and if he continues to refuse MRI he will be AMA as he is placing barriers to his care and potentially risking paralysis if he is found to have cauda equina or other emergent condition.     Pt has gotten meds, now agrees to get " "MRI.    Review of records shows MRI in "care everywhere" with diffuse mets.    Pt back from MRI, pain 8/10, have ordered more pain meds.      MRI results reviewed. Have spoken with neurosurg who recommends ED to ED transfer to [facility] for evaluation.    Pt upset that he is NPO. I have d/w pt that he should not eat/drink without first knowing neurosurgery plan.    I have independently evaluated and interpreted all available labs and imaging to the extent of the scope of my practice.  The suspected diagnosis, treatment and plan were discussed with the patient. All questions or concerns have been addressed.    Note was created using voice recognition software. Note may have occasional typographical or grammatical errors , garbled syntax, and other bizarre constructions that may not have been identified and edited despite good manju initial review prior to signing.     Clinical Impression:   Final diagnoses:  [R29.898] Weakness of lower extremity, unspecified laterality (Primary)  [C79.51] Metastatic cancer to spine  [W19.XXXA] Fall, initial encounter        ED Disposition Condition    Transfer to Another Facility Stable                Laura Laird MD  05/20/23 1946       Laura Laird MD  05/20/23 2152    "

## 2023-05-21 VITALS
BODY MASS INDEX: 24.35 KG/M2 | HEIGHT: 60 IN | WEIGHT: 124 LBS | SYSTOLIC BLOOD PRESSURE: 136 MMHG | DIASTOLIC BLOOD PRESSURE: 72 MMHG | OXYGEN SATURATION: 95 % | TEMPERATURE: 99 F | RESPIRATION RATE: 18 BRPM | HEART RATE: 103 BPM

## 2023-05-21 PROBLEM — G83.9: Status: ACTIVE | Noted: 2023-05-21

## 2023-05-21 PROCEDURE — 99283 PR EMERGENCY DEPT VISIT,LEVEL III: ICD-10-PCS | Mod: GW,,,

## 2023-05-21 PROCEDURE — 99283 EMERGENCY DEPT VISIT LOW MDM: CPT | Mod: GW,,,

## 2023-05-21 RX ORDER — IBUPROFEN 200 MG
1 TABLET ORAL DAILY
Status: DISCONTINUED | OUTPATIENT
Start: 2023-05-21 | End: 2023-05-21

## 2023-05-21 RX ORDER — NOREPINEPHRINE BITARTRATE/D5W 4MG/250ML
0-3 PLASTIC BAG, INJECTION (ML) INTRAVENOUS CONTINUOUS
Status: DISCONTINUED | OUTPATIENT
Start: 2023-05-21 | End: 2023-05-21

## 2023-05-21 NOTE — ED PROVIDER NOTES
"History:  Micheal Monroe is a 72 y.o. male who presents to the ED with Fall (Pt to ED via EMS reporting fall 2 days ago after tripping over his cat. Pt c/o lower back pain, decrease in urination, and hasn't had a BM since. Pt reports he has a friend who is a nurse that placed a you this morning. Pt reports he began having severe leg pain but now states "my legs feel dead, I only feel pressure". Pt takes Plavix. )    Described as 72-year-old male presenting to the emergency department as a transfer from [facility] for neurosurgical evaluation for spinal cord injury following fall 2 days ago with severe stenosis secondary to metastatic disease.  Patient endorses decreased sensation, inability to move legs, and bowel and bladder incontinence since his fall 2-3 days ago.  Prior to that, he was ambulatory with a cane.    Review of Systems: All systems reviewed and are negative except as per history of present illness.    Medications:   Previous Medications    ASCORBIC ACID, VITAMIN C, (VITAMIN C) 1000 MG TABLET    Take 1,000 mg by mouth.    ASCORBIC ACID, VITAMIN C, (VITAMIN C) 500 MG TABLET    Take 500 mg by mouth once daily.    ASPIRIN (ECOTRIN) 81 MG EC TABLET    Take 81 mg by mouth.    ASPIRIN 81 MG CHEW    Take 81 mg by mouth every evening.    CLOPIDOGREL (PLAVIX) 75 MG TABLET    Take 1 tablet (75 mg total) by mouth once daily.    COENZYME Q10 100 MG CAPSULE    Take 100 mg by mouth once daily.     COENZYME Q10 100 MG CAPSULE    Take 100 mg by mouth.    CYANOCOBALAMIN (VITAMIN B-12) 1000 MCG TABLET    Take 1 tablet by mouth every other day.    CYANOCOBALAMIN 500 MCG TABLET    Take 500 mcg by mouth.    CYANOCOBALAMIN 500 MCG TABLET    Take 500 mcg by mouth.    CYCLOBENZAPRINE (FLEXERIL) 10 MG TABLET    Take 10 mg by mouth.    DANDELION ROOT 525 MG CAP    Take 1 capsule by mouth.    HYDROCODONE-ACETAMINOPHEN (NORCO) 5-325 MG PER TABLET    Take by mouth.    LEVOTHYROXINE (SYNTHROID) 50 MCG TABLET    Take 1 tablet (50 " mcg total) by mouth before breakfast.    LISINOPRIL 10 MG TABLET    Take 1 tablet (10 mg total) by mouth once daily.    MUPIROCIN (BACTROBAN) 2 % OINTMENT    Apply 1 application topically.    PANTOPRAZOLE (PROTONIX) 40 MG TABLET    Take 40 mg by mouth nightly.     ROSUVASTATIN (CRESTOR) 10 MG TABLET    Take 1 tablet (10 mg total) by mouth every evening.    TIZANIDINE (ZANAFLEX) 4 MG TABLET    Take 4 mg by mouth every evening.       PMH:   Past Medical History:   Diagnosis Date    Acid reflux     Anticoagulant long-term use     PLAVIX and ASPIRIN    Coronary artery disease     had angiogram--pt refused STENT placement    Diverticula of intestine     Hematuria     Hypercholesteremia     Hypertension     Kidney stone      PSH:   Past Surgical History:   Procedure Laterality Date    EXTRACORPOREAL SHOCK WAVE LITHOTRIPSY      x 4 episodes in past    EYE SURGERY      removal of kidney stones      with nephrostomy tube in place for awhile     Allergies: He is allergic to ciprofloxacin and levofloxacin.  Social History: Marital Status: single. He  reports that he has been smoking cigarettes. He has a 20.00 pack-year smoking history. He has never used smokeless tobacco.. He  reports no history of alcohol use..       Exam:  VITAL SIGNS:   Vitals:    05/20/23 2231 05/20/23 2232 05/20/23 2255 05/21/23 0015   BP:  130/64  129/71   Pulse:  101  100   Resp:    18   Temp:   98.9 °F (37.2 °C)    TempSrc:   Oral    SpO2: (!) 94%   95%   Weight:       Height:         Const: Awake and alert, NAD   Head: Atraumatic  Eyes: Normal Conjunctiva  ENT: Normal External Ears, Nose and Mouth.  Neck: Full range of motion. No meningismus.  No midline tenderness to palpation, no step-offs  Resp: Normal respiratory effort, No distress  Cardio: Equal and intact distal pulses  Abd: Soft, non tender, non distended.   Skin: No petechiae or rashes  Ext: No cyanosis, or edema  Neur: Awake and alert, 0/5 strength BLE, decreased sensation BLE, upgoing  babinski. 5/5 strength BUE with normal sensation.   Psych: Normal Mood and Affect    Data:  Results for orders placed or performed during the hospital encounter of 05/20/23   CBC auto differential   Result Value Ref Range    WBC 10.19 3.90 - 12.70 K/uL    RBC 4.68 4.60 - 6.20 M/uL    Hemoglobin 12.0 (L) 14.0 - 18.0 g/dL    Hematocrit 36.4 (L) 40.0 - 54.0 %    MCV 78 (L) 82 - 98 fL    MCH 25.6 (L) 27.0 - 31.0 pg    MCHC 33.0 32.0 - 36.0 g/dL    RDW 16.3 (H) 11.5 - 14.5 %    Platelets 296 150 - 450 K/uL    MPV 9.2 9.2 - 12.9 fL    Immature Granulocytes 0.6 (H) 0.0 - 0.5 %    Gran # (ANC) 8.4 (H) 1.8 - 7.7 K/uL    Immature Grans (Abs) 0.06 (H) 0.00 - 0.04 K/uL    Lymph # 0.9 (L) 1.0 - 4.8 K/uL    Mono # 0.7 0.3 - 1.0 K/uL    Eos # 0.1 0.0 - 0.5 K/uL    Baso # 0.02 0.00 - 0.20 K/uL    nRBC 0 0 /100 WBC    Gran % 82.5 (H) 38.0 - 73.0 %    Lymph % 8.3 (L) 18.0 - 48.0 %    Mono % 7.2 4.0 - 15.0 %    Eosinophil % 1.2 0.0 - 8.0 %    Basophil % 0.2 0.0 - 1.9 %    Differential Method Automated    TSH   Result Value Ref Range    TSH 2.648 0.400 - 4.000 uIU/mL   Comprehensive metabolic panel   Result Value Ref Range    Sodium 138 136 - 145 mmol/L    Potassium 4.2 3.5 - 5.1 mmol/L    Chloride 100 95 - 110 mmol/L    CO2 25 23 - 29 mmol/L    Glucose 105 70 - 110 mg/dL    BUN 18 8 - 23 mg/dL    Creatinine 1.1 0.5 - 1.4 mg/dL    Calcium 10.2 8.7 - 10.5 mg/dL    Total Protein 6.9 6.0 - 8.4 g/dL    Albumin 2.8 (L) 3.5 - 5.2 g/dL    Total Bilirubin 0.4 0.1 - 1.0 mg/dL    Alkaline Phosphatase 75 55 - 135 U/L    AST 25 10 - 40 U/L    ALT 12 10 - 44 U/L    Anion Gap 13 8 - 16 mmol/L    eGFR >60 >60 mL/min/1.73 m^2   Magnesium   Result Value Ref Range    Magnesium 1.8 1.6 - 2.6 mg/dL   Phosphorus   Result Value Ref Range    Phosphorus 4.5 2.7 - 4.5 mg/dL   Ethanol   Result Value Ref Range    Alcohol, Serum <10 <10 mg/dL   Drug screen panel, emergency   Result Value Ref Range    Benzodiazepines Negative Negative    Methadone metabolites  Negative Negative    Cocaine (Metab.) Negative Negative    Opiate Scrn, Ur Presumptive Positive (A) Negative    Barbiturate Screen, Ur Negative Negative    Amphetamine Screen, Ur Negative Negative    THC Negative Negative    Phencyclidine Negative Negative    Creatinine, Urine 63.4 23.0 - 375.0 mg/dL    Toxicology Information SEE COMMENT    CPK   Result Value Ref Range    CPK 25 20 - 200 U/L     Imaging Results               MRI Lumbar Spine Without Contrast (Final result)  Result time 05/20/23 18:53:06      Final result by Karina Urias MD (05/20/23 18:53:06)                   Impression:      Diffuse osseous metastatic disease throughout the thoracic spine, lumbar spine, and sacrum with multilevel soft tissue tumor extension.  Mild pathologic compression fractures are visualized at T5, T6, T8, T10, and L1.  Soft tissue extension of tumor is visualized extending into the spinal canal at multiple levels including the T5-6, T8, and T10 levels resulting in at least moderate to severe spinal canal stenosis.  Abnormal cord signal is visualized at T10.    This report was flagged in Epic as abnormal.      Electronically signed by: Karina Urias MD  Date:    05/20/2023  Time:    18:53               Narrative:    EXAMINATION:  MRI THORACIC SPINE WITHOUT CONTRAST; MRI LUMBAR SPINE WITHOUT CONTRAST    CLINICAL HISTORY:  Back trauma, abnormal neuro exam, CT or xray positive (Age >= 16y);    TECHNIQUE:  Multiplanar, multisequence images were performed of the thoracic spine and lumbar spine.  Contrast was not administered.    COMPARISON:  None    FINDINGS:  There is diffuse osseous metastatic disease seen throughout the thoracic and lumbar spine and visualized sacrum.  Mild compression fractures are visualized at the T5, T6, T8, T10, and L1 vertebral bodies.  There is retropulsion suspected soft tissue extension of tumor seen into the spinal canal at the T5-6, T8, and T10 levels which result in moderate to severe spinal  canal stenosis.  Abnormal focal cord signal is visualized at the T10 level.  Multilevel suspected soft tissue tumor also seen within the paraspinous musculature of the thoracic spine, most pronounced on the right at the T10 level.  Conus terminates at the L1 level.                                        MRI Thoracic Spine Without Contrast (Final result)  Result time 05/20/23 18:53:06      Final result by Karina Urias MD (05/20/23 18:53:06)                   Impression:      Diffuse osseous metastatic disease throughout the thoracic spine, lumbar spine, and sacrum with multilevel soft tissue tumor extension.  Mild pathologic compression fractures are visualized at T5, T6, T8, T10, and L1.  Soft tissue extension of tumor is visualized extending into the spinal canal at multiple levels including the T5-6, T8, and T10 levels resulting in at least moderate to severe spinal canal stenosis.  Abnormal cord signal is visualized at T10.    This report was flagged in Epic as abnormal.      Electronically signed by: Karina Urias MD  Date:    05/20/2023  Time:    18:53               Narrative:    EXAMINATION:  MRI THORACIC SPINE WITHOUT CONTRAST; MRI LUMBAR SPINE WITHOUT CONTRAST    CLINICAL HISTORY:  Back trauma, abnormal neuro exam, CT or xray positive (Age >= 16y);    TECHNIQUE:  Multiplanar, multisequence images were performed of the thoracic spine and lumbar spine.  Contrast was not administered.    COMPARISON:  None    FINDINGS:  There is diffuse osseous metastatic disease seen throughout the thoracic and lumbar spine and visualized sacrum.  Mild compression fractures are visualized at the T5, T6, T8, T10, and L1 vertebral bodies.  There is retropulsion suspected soft tissue extension of tumor seen into the spinal canal at the T5-6, T8, and T10 levels which result in moderate to severe spinal canal stenosis.  Abnormal focal cord signal is visualized at the T10 level.  Multilevel suspected soft tissue tumor also seen  within the paraspinous musculature of the thoracic spine, most pronounced on the right at the T10 level.  Conus terminates at the L1 level.                                       CT Cervical Spine Without Contrast (Edited Result - FINAL)  Result time 05/20/23 18:55:15      Addendum (preliminary) 1 of 1 by Karina Urias MD (05/20/23 18:55:15)      Abnormal lucency described within the C5 vertebral body would be consistent with metastatic disease given findings on subsequent thoracic and lumbar spine MRI.  Additional lucencies consistent with metastatic disease also visualized involving C2 and the partially visualized T3 vertebral bodies.      Electronically signed by: Karina Urias MD  Date:    05/20/2023  Time:    18:55                   Final result by Karina Urias MD (05/20/23 17:42:29)                   Impression:      No evidence of acute cervical spine fracture or dislocation.      Electronically signed by: Karina Urias MD  Date:    05/20/2023  Time:    17:42               Narrative:    EXAMINATION:  CT CERVICAL SPINE WITHOUT CONTRAST    CLINICAL HISTORY:  Neck trauma (Age >= 65y);    TECHNIQUE:  Low dose axial images, sagittal and coronal reformations were performed though the cervical spine.  Contrast was not administered.    COMPARISON:  None    FINDINGS:  No evidence of acute cervical spine fracture or dislocation.  Odontoid process is intact.  Craniocervical junction is unremarkable.  Cervical spine alignment is within normal limits.  Intervertebral disc space narrowing and anterior degenerative spurring are seen at the C5-6 and C6-7 levels.  Nonspecific lucency is seen within the C5 vertebral body.    Surrounding soft tissues show no significant abnormalities.  Airway is patent.  Partially visualized lung apices are clear.                                       CT Head Without Contrast (Final result)  Result time 05/20/23 17:39:41      Final result by Karina Urias MD (05/20/23 17:39:41)                    Impression:      No acute intracranial abnormalities identified.      Electronically signed by: Karina Urias MD  Date:    2023  Time:    17:39               Narrative:    EXAMINATION:  CT HEAD WITHOUT CONTRAST    CLINICAL HISTORY:  Head trauma, minor (Age >= 65y);trauma;    TECHNIQUE:  Low dose axial images were obtained through the head.  Coronal and sagittal reformations were also performed. Contrast was not administered.    COMPARISON:  None.    FINDINGS:  No evidence of acute/recent major vascular distribution cerebral infarction, intraparenchymal hemorrhage, or intra-axial space occupying lesion. The ventricular system is normal in size and configuration with no evidence of hydrocephalus. No effacement of the skull-base cisterns. No abnormal extra-axial fluid collections or blood products. Visualized paranasal sinuses and mastoid air cells are clear. The calvarium shows no significant abnormality.                                    Labs & Imaging studies were reviewed independently by me.     Medical Decision Makin-year-old male transferred for neurosurgical evaluation of severe spinal canal stenosis and like a spinal cord injury following fall in the setting of known metastatic disease.  Neurosurgery recommends preop labs, CT entire spine, MRI cervical spine, map greater than 85, and admission to neurocritical care. NCC consulted. Patient upset because he wants a cigarette, offered nicotine patch.     Patient is smoking in his ER room, cigarettes confiscated.    Patient refuses admission to the hospital, reports he wants leave against medical advice.  He was informed that his condition is very serious, [facility] department pr I will assess with complications including possible bed sores or wounds, blood clots, infection and ultimately death.  He then reported that he is already on hospice and would like to return home.  Refused social work consultation for further aid in setting up  home health or home hospice.  He voiced these same opinions to both neurosurgery and neurocritical care, refusing admission or any intervention.  He refused to allow me to speak with his girlfriend, who he reports will be his caregiver at home.  He was encouraged to return at any time should he need further assistance.    Clinical Impression:  1. Acute paralysis due to lesion of spinal cord    2. Weakness of lower extremity, unspecified laterality    3. 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 Fall, initial encounter                 Heidi Boyce MD  05/21/23 0137

## 2023-05-21 NOTE — SUBJECTIVE & OBJECTIVE
Past Medical History:   Diagnosis Date    Acid reflux     Anticoagulant long-term use     PLAVIX and ASPIRIN    Coronary artery disease     had angiogram--pt refused STENT placement    Diverticula of intestine     Hematuria     Hypercholesteremia     Hypertension     Kidney stone      Past Surgical History:   Procedure Laterality Date    EXTRACORPOREAL SHOCK WAVE LITHOTRIPSY      x 4 episodes in past    EYE SURGERY      removal of kidney stones      with nephrostomy tube in place for awhile      No current facility-administered medications on file prior to encounter.     Current Outpatient Medications on File Prior to Encounter   Medication Sig Dispense Refill    ascorbic acid, vitamin C, (VITAMIN C) 1000 MG tablet Take 1,000 mg by mouth.      ascorbic acid, vitamin C, (VITAMIN C) 500 MG tablet Take 500 mg by mouth once daily.      aspirin (ECOTRIN) 81 MG EC tablet Take 81 mg by mouth.      aspirin 81 MG Chew Take 81 mg by mouth every evening.      clopidogreL (PLAVIX) 75 mg tablet Take 1 tablet (75 mg total) by mouth once daily. 90 tablet 3    coenzyme Q10 100 mg capsule Take 100 mg by mouth once daily.   0    coenzyme Q10 100 mg capsule Take 100 mg by mouth.      cyanocobalamin (VITAMIN B-12) 1000 MCG tablet Take 1 tablet by mouth every other day.      cyanocobalamin 500 MCG tablet Take 500 mcg by mouth.      cyanocobalamin 500 MCG tablet Take 500 mcg by mouth.      cyclobenzaprine (FLEXERIL) 10 MG tablet Take 10 mg by mouth.      dandelion root 525 mg Cap Take 1 capsule by mouth.      HYDROcodone-acetaminophen (NORCO) 5-325 mg per tablet Take by mouth.      levothyroxine (SYNTHROID) 50 MCG tablet Take 1 tablet (50 mcg total) by mouth before breakfast. 90 tablet 3    lisinopriL 10 MG tablet Take 1 tablet (10 mg total) by mouth once daily. 90 tablet 3    mupirocin (BACTROBAN) 2 % ointment Apply 1 application topically.      pantoprazole (PROTONIX) 40 MG tablet Take 40 mg by mouth nightly.       rosuvastatin  (CRESTOR) 10 MG tablet Take 1 tablet (10 mg total) by mouth every evening. 90 tablet 3    tiZANidine (ZANAFLEX) 4 MG tablet Take 4 mg by mouth every evening.        Allergies: Ciprofloxacin and Levofloxacin    History reviewed. No pertinent family history.  Social History     Tobacco Use    Smoking status: Every Day     Packs/day: 0.50     Years: 40.00     Pack years: 20.00     Types: Cigarettes    Smokeless tobacco: Never   Substance Use Topics    Alcohol use: No    Drug use: No     Review of Systems   Constitutional:  Negative for chills, fatigue and fever.   HENT:          Negative for head trauma.    Eyes:  Negative for visual disturbance.   Respiratory:  Negative for shortness of breath.    Cardiovascular:  Negative for chest pain.   Gastrointestinal:  Positive for constipation.   Genitourinary:  Positive for difficulty urinating.   Musculoskeletal:  Positive for back pain and gait problem. Negative for neck pain and neck stiffness.   Skin:  Negative for wound.   Neurological:  Positive for weakness (bilateral lower extremities) and numbness (bilateral lower extremities).   Psychiatric/Behavioral:  Negative for agitation and confusion.    Objective:     Vitals:    Temp: 98.9 °F (37.2 °C)  Pulse: 103  BP: 136/72  MAP (mmHg): 98  Resp: 18  SpO2: 95 %    Temp  Min: 98.6 °F (37 °C)  Max: 99 °F (37.2 °C)  Pulse  Min: 99  Max: 118  BP  Min: 128/77  Max: 167/78  MAP (mmHg)  Min: 90  Max: 112  Resp  Min: 16  Max: 18  SpO2  Min: 92 %  Max: 97 %    05/20 0701 - 05/21 0700  In: 2000   Out: -             Physical Exam  Vitals and nursing note reviewed.   Constitutional:       General: He is not in acute distress.     Appearance: Normal appearance.      Comments: Chronically-ill appearing. Well developed. Well nourished. Resting comfortably in bed.   HENT:      Head: Normocephalic and atraumatic.      Right Ear: External ear normal.      Left Ear: External ear normal.      Nose: Nose normal.      Mouth/Throat:      Mouth:  Mucous membranes are moist.      Pharynx: Oropharynx is clear.   Eyes:      General: No scleral icterus.     Extraocular Movements: Extraocular movements intact.      Pupils: Pupils are equal, round, and reactive to light.   Cardiovascular:      Rate and Rhythm: Normal rate and regular rhythm.      Pulses: Normal pulses.   Pulmonary:      Effort: Pulmonary effort is normal. No respiratory distress.   Abdominal:      General: Abdomen is flat. There is no distension.   Musculoskeletal:      Cervical back: Normal range of motion. No rigidity or tenderness.      Right lower leg: No edema.      Left lower leg: No edema.   Skin:     General: Skin is warm and dry.   Neurological:      Mental Status: He is alert.      Comments: [id number]  Awake, alert, & oriented x 4. Speech fluent. Follows commands briskly.   Visual fields full to confrontation bilaterally. PERRLA. EOMI. SNo facial asymmetry. Conversational hearing intact. Swallows without difficulty. Voice is not hoarse. Shoulder shrug intact. Tongue protrudes midline with no deviations or fasciculations.   Moves BUE spontaneously, symmetrically, and AG. No movement in bilateral lower extremities. Strength 5/5 in BUE & 1/5 in BLE. Sensation intact to light touch in BUE. Subjective numbness in BLE but allodynia noted when testing Babinski's. Upgoing Babinski's bilaterally.     Gait & coordination exams deferred.    Today I personally reviewed pertinent medications, lines/drains/airways, imaging, laboratory results, notably:      Laboratory:  CBC:  Recent Labs   Lab 05/20/23  1743   WBC 10.19   RBC 4.68   HGB 12.0*   HCT 36.4*      MCV 78*   MCH 25.6*   MCHC 33.0       CMP:  Recent Labs   Lab 05/20/23  1743   CALCIUM 10.2   PROT 6.9      K 4.2   CO2 25      BUN 18   CREATININE 1.1   ALKPHOS 75   ALT 12   AST 25   BILITOT 0.4     Recent Labs   Lab 05/20/23  1743   MG 1.8   PHOS 4.5        Endocrine:  Recent Labs     05/20/23  1743   TSH 2.648               Imaging Results               MRI Lumbar Spine Without Contrast (Final result)  Result time 05/20/23 18:53:06      Final result by Karina Urias MD (05/20/23 18:53:06)                   Impression:      Diffuse osseous metastatic disease throughout the thoracic spine, lumbar spine, and sacrum with multilevel soft tissue tumor extension.  Mild pathologic compression fractures are visualized at T5, T6, T8, T10, and L1.  Soft tissue extension of tumor is visualized extending into the spinal canal at multiple levels including the T5-6, T8, and T10 levels resulting in at least moderate to severe spinal canal stenosis.  Abnormal cord signal is visualized at T10.    This report was flagged in Epic as abnormal.      Electronically signed by: Karina Urias MD  Date:    05/20/2023  Time:    18:53               Narrative:    EXAMINATION:  MRI THORACIC SPINE WITHOUT CONTRAST; MRI LUMBAR SPINE WITHOUT CONTRAST    CLINICAL HISTORY:  Back trauma, abnormal neuro exam, CT or xray positive (Age >= 16y);    TECHNIQUE:  Multiplanar, multisequence images were performed of the thoracic spine and lumbar spine.  Contrast was not administered.    COMPARISON:  None    FINDINGS:  There is diffuse osseous metastatic disease seen throughout the thoracic and lumbar spine and visualized sacrum.  Mild compression fractures are visualized at the T5, T6, T8, T10, and L1 vertebral bodies.  There is retropulsion suspected soft tissue extension of tumor seen into the spinal canal at the T5-6, T8, and T10 levels which result in moderate to severe spinal canal stenosis.  Abnormal focal cord signal is visualized at the T10 level.  Multilevel suspected soft tissue tumor also seen within the paraspinous musculature of the thoracic spine, most pronounced on the right at the T10 level.  Conus terminates at the L1 level.                                        MRI Thoracic Spine Without Contrast (Final result)  Result time 05/20/23 18:53:06      Final  result by Karina Urias MD (05/20/23 18:53:06)                   Impression:      Diffuse osseous metastatic disease throughout the thoracic spine, lumbar spine, and sacrum with multilevel soft tissue tumor extension.  Mild pathologic compression fractures are visualized at T5, T6, T8, T10, and L1.  Soft tissue extension of tumor is visualized extending into the spinal canal at multiple levels including the T5-6, T8, and T10 levels resulting in at least moderate to severe spinal canal stenosis.  Abnormal cord signal is visualized at T10.    This report was flagged in Epic as abnormal.      Electronically signed by: Karina Urias MD  Date:    05/20/2023  Time:    18:53               Narrative:    EXAMINATION:  MRI THORACIC SPINE WITHOUT CONTRAST; MRI LUMBAR SPINE WITHOUT CONTRAST    CLINICAL HISTORY:  Back trauma, abnormal neuro exam, CT or xray positive (Age >= 16y);    TECHNIQUE:  Multiplanar, multisequence images were performed of the thoracic spine and lumbar spine.  Contrast was not administered.    COMPARISON:  None    FINDINGS:  There is diffuse osseous metastatic disease seen throughout the thoracic and lumbar spine and visualized sacrum.  Mild compression fractures are visualized at the T5, T6, T8, T10, and L1 vertebral bodies.  There is retropulsion suspected soft tissue extension of tumor seen into the spinal canal at the T5-6, T8, and T10 levels which result in moderate to severe spinal canal stenosis.  Abnormal focal cord signal is visualized at the T10 level.  Multilevel suspected soft tissue tumor also seen within the paraspinous musculature of the thoracic spine, most pronounced on the right at the T10 level.  Conus terminates at the L1 level.                                       CT Cervical Spine Without Contrast (Edited Result - FINAL)  Result time 05/20/23 18:55:15      Addendum (preliminary) 1 of 1 by Karina Urias MD (05/20/23 18:55:15)      Abnormal lucency described within the C5  vertebral body would be consistent with metastatic disease given findings on subsequent thoracic and lumbar spine MRI.  Additional lucencies consistent with metastatic disease also visualized involving C2 and the partially visualized T3 vertebral bodies.      Electronically signed by: Karina Urias MD  Date:    05/20/2023  Time:    18:55                   Final result by Karina Urias MD (05/20/23 17:42:29)                   Impression:      No evidence of acute cervical spine fracture or dislocation.      Electronically signed by: Karina Urias MD  Date:    05/20/2023  Time:    17:42               Narrative:    EXAMINATION:  CT CERVICAL SPINE WITHOUT CONTRAST    CLINICAL HISTORY:  Neck trauma (Age >= 65y);    TECHNIQUE:  Low dose axial images, sagittal and coronal reformations were performed though the cervical spine.  Contrast was not administered.    COMPARISON:  None    FINDINGS:  No evidence of acute cervical spine fracture or dislocation.  Odontoid process is intact.  Craniocervical junction is unremarkable.  Cervical spine alignment is within normal limits.  Intervertebral disc space narrowing and anterior degenerative spurring are seen at the C5-6 and C6-7 levels.  Nonspecific lucency is seen within the C5 vertebral body.    Surrounding soft tissues show no significant abnormalities.  Airway is patent.  Partially visualized lung apices are clear.                                       CT Head Without Contrast (Final result)  Result time 05/20/23 17:39:41      Final result by Karina Urias MD (05/20/23 17:39:41)                   Impression:      No acute intracranial abnormalities identified.      Electronically signed by: Karina Urias MD  Date:    05/20/2023  Time:    17:39               Narrative:    EXAMINATION:  CT HEAD WITHOUT CONTRAST    CLINICAL HISTORY:  Head trauma, minor (Age >= 65y);trauma;    TECHNIQUE:  Low dose axial images were obtained through the head.  Coronal and sagittal  reformations were also performed. Contrast was not administered.    COMPARISON:  None.    FINDINGS:  No evidence of acute/recent major vascular distribution cerebral infarction, intraparenchymal hemorrhage, or intra-axial space occupying lesion. The ventricular system is normal in size and configuration with no evidence of hydrocephalus. No effacement of the skull-base cisterns. No abnormal extra-axial fluid collections or blood products. Visualized paranasal sinuses and mastoid air cells are clear. The calvarium shows no significant abnormality.

## 2023-05-21 NOTE — CONSULTS
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 However, patient has refused any cooperation in obtaining history or doing a physical exam. He was smoking an [employer] cigarette when I entered his room and stated he is done with us, and going home.     Pt has capacity and has full understanding of the severity of his injury. He understands unlikely outcome of ever recovering if leaving.     Patients wishes discussed with ED, NCC team, and neurosurgery staff. Patient refuses surgery against medical advice.    Neurosurgery will sign off    Watson Bauer

## 2023-05-21 NOTE — ED NOTES
"Received report from [facility]. Pt laying in bed, pt states, "I can't find my wallet and keys." Pt reports having the wallet and keys in pocket before MRI.   "

## 2023-05-21 NOTE — ASSESSMENT & PLAN NOTE
72 y.o. male with history of HTN, HLD, CAD on DAPT, tobacco abuse, lymphoma with extensive mets to the spine (untreated), chronic low back pain, and recent wedge compression fractures at T6 and T8 (2/2023) transferred from [facility] for neurosurgical evaluation of severe spinal cord stenosis and likely SCI s/p mechanical fall that occurred 2 days ago. [facility] consulted for admission for close monitoring and a higher level of care.   - MRI Thoracic and Lumbar Spine showed moderate to severe spinal cord stenosis at multiple levels (T5-6, T8, & T10), mild pathologic compression fractures at multiple levels (T5, T6, T8, T10, and L1), and abnormal cord signal at T10.  - Patient refused admission and further workup, including, but not limited to, maintaining MAP goals > 85, imaging, and all lab work  - Patient repeatedly states that he wants to leave AMA and conveys his wishes to ED and neurosurgery teams as well.  - I expressed my concerns and educated the patient extensively on the risks of leaving. Patient expressed understanding and reports that he will take care of it later and follow up outpatient because he wants to be comfortable and go home.  - Neurocritical care will sign off at this time.

## 2023-05-21 NOTE — NURSING
Contacted MRI techJeremy, regarding personal belongings. Tech reports that all belongings were placed in a patient belonging bag and placed on the back of stretcher and transported with patient back to room 18 after completion of scan.  Items included phone, change, wallet, keys, ect... Notified Charge nurse who states will check for belongings.

## 2023-05-21 NOTE — CONSULTS
"[facility] - Emergency Dept  Neurocritical Care  Consult Note    Admit Date: 5/20/2023  Service Date: 05/21/2023  Length of Stay: 0    Inpatient consult to Neuro ICU  Consult performed by: Christel Lozano PA-C  Consult ordered by: Heidi Boyce MD        Subjective:     Chief Complaint: Acute paralysis due to lesion of spinal cord    History of Present Illness: Micheal Monroe is a 72 y.o.male with history of HTN, HLD, CAD on DAPT, tobacco abuse, lymphoma with extensive mets to the spine (untreated), chronic low back pain, and recent wedge compression fractures at T6 and T8 (2/2023) transferred from [facility] [place] [facility] for neurosurgical evaluation of severe spinal cord stenosis and likely SCI s/p mechanical fall that occurred 2 days ago. Per chart review, the patient reported that his cat attacked him, which caused him to slip and fall. He denies any head trauma and LOC. Since then, he has complained of progressively worsening BLE numbness and weakness, which he describes as "my legs feel dead...I only feel pressure." He also reports constipation, which resolved after receiving an enema, as well as urinary retention, which required you placement by his home health nurse. The patient was brought to the ED at OSH via EMS, where his exam was reported as inconsistent. He initially refused his MRI, reporting that he would leave AMA and do it outpatient because he was in "too much pain," and if he went to MRI, "he will move all around." He later agreed to the imaging after receiving pain medication but later complained about being NPO. MRI Thoracic and Lumbar Spine showed moderate to severe spinal cord stenosis at multiple levels (T5-6, T8, & T10), mild pathologic compression fractures at multiple levels (T5, T6, T8, T10, and L1), and abnormal cord signal at T10. He was transferred to [facility] [place] for [place] evaluation and possible intervention. [facility] for possible admission for close monitoring and a higher level of " "care.     Currently, the patient states that he is miserable and wants to leave AMA. He reports that his girlfriend lives with him and has taken off the entire week to care for him. He stated that "maybe I'll just go hospice." I told the patient that we could give him medication for his pain and have social workers that could help facilitate the transition to hospice, but he again denied admission. He refused any further workup including, but not limited to, maintaining MAP goals > 85, further imaging, and all lab work. I expressed my concerns and the risks of leaving AMA.  I asked the patient if he would at least stay so that neurosurgery could evaluate him, to which he replied, "I'll take care of it later. I just want to go home. But not in that ambulance that brought me here. I want to leave with [facility]ri. They have cushions."        Past Medical History:   Diagnosis Date    Acid reflux     Anticoagulant long-term use     PLAVIX and ASPIRIN    Coronary artery disease     had angiogram--pt refused STENT placement    Diverticula of intestine     Hematuria     Hypercholesteremia     Hypertension     Kidney stone      Past Surgical History:   Procedure Laterality Date    EXTRACORPOREAL SHOCK WAVE LITHOTRIPSY      x 4 episodes in past    EYE SURGERY      removal of kidney stones      with nephrostomy tube in place for awhile      No current facility-administered medications on file prior to encounter.     Current Outpatient Medications on File Prior to Encounter   Medication Sig Dispense Refill    ascorbic acid, vitamin C, (VITAMIN C) 1000 MG tablet Take 1,000 mg by mouth.      ascorbic acid, vitamin C, (VITAMIN C) 500 MG tablet Take 500 mg by mouth once daily.      aspirin (ECOTRIN) 81 MG EC tablet Take 81 mg by mouth.      aspirin 81 MG Chew Take 81 mg by mouth every evening.      clopidogreL (PLAVIX) 75 mg tablet Take 1 tablet (75 mg total) by mouth once daily. 90 tablet 3    coenzyme Q10 100 mg " capsule Take 100 mg by mouth once daily.   0    coenzyme Q10 100 mg capsule Take 100 mg by mouth.      cyanocobalamin (VITAMIN B-12) 1000 MCG tablet Take 1 tablet by mouth every other day.      cyanocobalamin 500 MCG tablet Take 500 mcg by mouth.      cyanocobalamin 500 MCG tablet Take 500 mcg by mouth.      cyclobenzaprine (FLEXERIL) 10 MG tablet Take 10 mg by mouth.      dandelion root 525 mg Cap Take 1 capsule by mouth.      HYDROcodone-acetaminophen (NORCO) 5-325 mg per tablet Take by mouth.      levothyroxine (SYNTHROID) 50 MCG tablet Take 1 tablet (50 mcg total) by mouth before breakfast. 90 tablet 3    lisinopriL 10 MG tablet Take 1 tablet (10 mg total) by mouth once daily. 90 tablet 3    mupirocin (BACTROBAN) 2 % ointment Apply 1 application topically.      pantoprazole (PROTONIX) 40 MG tablet Take 40 mg by mouth nightly.       rosuvastatin (CRESTOR) 10 MG tablet Take 1 tablet (10 mg total) by mouth every evening. 90 tablet 3    tiZANidine (ZANAFLEX) 4 MG tablet Take 4 mg by mouth every evening.        Allergies: Ciprofloxacin and Levofloxacin    History reviewed. No pertinent family history.  Social History     Tobacco Use    Smoking status: Every Day     Packs/day: 0.50     Years: 40.00     Pack years: 20.00     Types: Cigarettes    Smokeless tobacco: Never   Substance Use Topics    Alcohol use: No    Drug use: No     Review of Systems   Constitutional:  Negative for chills, fatigue and fever.   HENT:          Negative for head trauma.    Eyes:  Negative for visual disturbance.   Respiratory:  Negative for shortness of breath.    Cardiovascular:  Negative for chest pain.   Gastrointestinal:  Positive for constipation.   Genitourinary:  Positive for difficulty urinating.   Musculoskeletal:  Positive for back pain and gait problem. Negative for neck pain and neck stiffness.   Skin:  Negative for wound.   Neurological:  Positive for weakness (bilateral lower extremities) and numbness  (bilateral lower extremities).   Psychiatric/Behavioral:  Negative for agitation and confusion.    Objective:     Vitals:    Temp: 98.9 °F (37.2 °C)  Pulse: 103  BP: 136/72  MAP (mmHg): 98  Resp: 18  SpO2: 95 %    Temp  Min: 98.6 °F (37 °C)  Max: 99 °F (37.2 °C)  Pulse  Min: 99  Max: 118  BP  Min: 128/77  Max: 167/78  MAP (mmHg)  Min: 90  Max: 112  Resp  Min: 16  Max: 18  SpO2  Min: 92 %  Max: 97 %    05/20 0701 - 05/21 0700  In: 2000   Out: -             Physical Exam  Vitals and nursing note reviewed.   Constitutional:       General: He is not in acute distress.     Appearance: Normal appearance.      Comments: Chronically-ill appearing. Well developed. Well nourished. Resting comfortably in bed.   HENT:      Head: Normocephalic and atraumatic.      Right Ear: External ear normal.      Left Ear: External ear normal.      Nose: Nose normal.      Mouth/Throat:      Mouth: Mucous membranes are moist.      Pharynx: Oropharynx is clear.   Eyes:      General: No scleral icterus.     Extraocular Movements: Extraocular movements intact.      Pupils: Pupils are equal, round, and reactive to light.   Cardiovascular:      Rate and Rhythm: Normal rate and regular rhythm.      Pulses: Normal pulses.   Pulmonary:      Effort: Pulmonary effort is normal. No respiratory distress.   Abdominal:      General: Abdomen is flat. There is no distension.   Musculoskeletal:      Cervical back: Normal range of motion. No rigidity or tenderness.      Right lower leg: No edema.      Left lower leg: No edema.   Skin:     General: Skin is warm and dry.   Neurological:      Mental Status: He is alert.      Comments: [id number]  Awake, alert, & oriented x 4. Speech fluent. Follows commands briskly.   Visual fields full to confrontation bilaterally. PERRLA. EOMI. SNo facial asymmetry. Conversational hearing intact. Swallows without difficulty. Voice is not hoarse. Shoulder shrug intact. Tongue protrudes midline with no deviations or fasciculations.    Moves BUE spontaneously, symmetrically, and AG. No movement in bilateral lower extremities. Strength 5/5 in BUE & 1/5 in BLE. Sensation intact to light touch in BUE. Subjective numbness in BLE but allodynia noted when testing Babinski's. Upgoing Babinski's bilaterally.     Gait & coordination exams deferred.    Today I personally reviewed pertinent medications, lines/drains/airways, imaging, laboratory results, notably:      Laboratory:  CBC:  Recent Labs   Lab 05/20/23  1743   WBC 10.19   RBC 4.68   HGB 12.0*   HCT 36.4*      MCV 78*   MCH 25.6*   MCHC 33.0       CMP:  Recent Labs   Lab 05/20/23  1743   CALCIUM 10.2   PROT 6.9      K 4.2   CO2 25      BUN 18   CREATININE 1.1   ALKPHOS 75   ALT 12   AST 25   BILITOT 0.4     Recent Labs   Lab 05/20/23  1743   MG 1.8   PHOS 4.5        Endocrine:  Recent Labs     05/20/23  1743   TSH 2.648              Imaging Results               MRI Lumbar Spine Without Contrast (Final result)  Result time 05/20/23 18:53:06      Final result by Karina Urias MD (05/20/23 18:53:06)                   Impression:      Diffuse osseous metastatic disease throughout the thoracic spine, lumbar spine, and sacrum with multilevel soft tissue tumor extension.  Mild pathologic compression fractures are visualized at T5, T6, T8, T10, and L1.  Soft tissue extension of tumor is visualized extending into the spinal canal at multiple levels including the T5-6, T8, and T10 levels resulting in at least moderate to severe spinal canal stenosis.  Abnormal cord signal is visualized at T10.    This report was flagged in Epic as abnormal.      Electronically signed by: Karina Urias MD  Date:    05/20/2023  Time:    18:53               Narrative:    EXAMINATION:  MRI THORACIC SPINE WITHOUT CONTRAST; MRI LUMBAR SPINE WITHOUT CONTRAST    CLINICAL HISTORY:  Back trauma, abnormal neuro exam, CT or xray positive (Age >= 16y);    TECHNIQUE:  Multiplanar, multisequence images were  performed of the thoracic spine and lumbar spine.  Contrast was not administered.    COMPARISON:  None    FINDINGS:  There is diffuse osseous metastatic disease seen throughout the thoracic and lumbar spine and visualized sacrum.  Mild compression fractures are visualized at the T5, T6, T8, T10, and L1 vertebral bodies.  There is retropulsion suspected soft tissue extension of tumor seen into the spinal canal at the T5-6, T8, and T10 levels which result in moderate to severe spinal canal stenosis.  Abnormal focal cord signal is visualized at the T10 level.  Multilevel suspected soft tissue tumor also seen within the paraspinous musculature of the thoracic spine, most pronounced on the right at the T10 level.  Conus terminates at the L1 level.                                        MRI Thoracic Spine Without Contrast (Final result)  Result time 05/20/23 18:53:06      Final result by Karina Urias MD (05/20/23 18:53:06)                   Impression:      Diffuse osseous metastatic disease throughout the thoracic spine, lumbar spine, and sacrum with multilevel soft tissue tumor extension.  Mild pathologic compression fractures are visualized at T5, T6, T8, T10, and L1.  Soft tissue extension of tumor is visualized extending into the spinal canal at multiple levels including the T5-6, T8, and T10 levels resulting in at least moderate to severe spinal canal stenosis.  Abnormal cord signal is visualized at T10.    This report was flagged in Epic as abnormal.      Electronically signed by: Karina Urias MD  Date:    05/20/2023  Time:    18:53               Narrative:    EXAMINATION:  MRI THORACIC SPINE WITHOUT CONTRAST; MRI LUMBAR SPINE WITHOUT CONTRAST    CLINICAL HISTORY:  Back trauma, abnormal neuro exam, CT or xray positive (Age >= 16y);    TECHNIQUE:  Multiplanar, multisequence images were performed of the thoracic spine and lumbar spine.  Contrast was not administered.    COMPARISON:  None    FINDINGS:  There is  diffuse osseous metastatic disease seen throughout the thoracic and lumbar spine and visualized sacrum.  Mild compression fractures are visualized at the T5, T6, T8, T10, and L1 vertebral bodies.  There is retropulsion suspected soft tissue extension of tumor seen into the spinal canal at the T5-6, T8, and T10 levels which result in moderate to severe spinal canal stenosis.  Abnormal focal cord signal is visualized at the T10 level.  Multilevel suspected soft tissue tumor also seen within the paraspinous musculature of the thoracic spine, most pronounced on the right at the T10 level.  Conus terminates at the L1 level.                                       CT Cervical Spine Without Contrast (Edited Result - FINAL)  Result time 05/20/23 18:55:15      Addendum (preliminary) 1 of 1 by Karina Urias MD (05/20/23 18:55:15)      Abnormal lucency described within the C5 vertebral body would be consistent with metastatic disease given findings on subsequent thoracic and lumbar spine MRI.  Additional lucencies consistent with metastatic disease also visualized involving C2 and the partially visualized T3 vertebral bodies.      Electronically signed by: Karina Urias MD  Date:    05/20/2023  Time:    18:55                   Final result by Karina Urias MD (05/20/23 17:42:29)                   Impression:      No evidence of acute cervical spine fracture or dislocation.      Electronically signed by: Karina Urias MD  Date:    05/20/2023  Time:    17:42               Narrative:    EXAMINATION:  CT CERVICAL SPINE WITHOUT CONTRAST    CLINICAL HISTORY:  Neck trauma (Age >= 65y);    TECHNIQUE:  Low dose axial images, sagittal and coronal reformations were performed though the cervical spine.  Contrast was not administered.    COMPARISON:  None    FINDINGS:  No evidence of acute cervical spine fracture or dislocation.  Odontoid process is intact.  Craniocervical junction is unremarkable.  Cervical spine alignment is  within normal limits.  Intervertebral disc space narrowing and anterior degenerative spurring are seen at the C5-6 and C6-7 levels.  Nonspecific lucency is seen within the C5 vertebral body.    Surrounding soft tissues show no significant abnormalities.  Airway is patent.  Partially visualized lung apices are clear.                                       CT Head Without Contrast (Final result)  Result time 05/20/23 17:39:41      Final result by Karina Urias MD (05/20/23 17:39:41)                   Impression:      No acute intracranial abnormalities identified.      Electronically signed by: Karina Urias MD  Date:    05/20/2023  Time:    17:39               Narrative:    EXAMINATION:  CT HEAD WITHOUT CONTRAST    CLINICAL HISTORY:  Head trauma, minor (Age >= 65y);trauma;    TECHNIQUE:  Low dose axial images were obtained through the head.  Coronal and sagittal reformations were also performed. Contrast was not administered.    COMPARISON:  None.    FINDINGS:  No evidence of acute/recent major vascular distribution cerebral infarction, intraparenchymal hemorrhage, or intra-axial space occupying lesion. The ventricular system is normal in size and configuration with no evidence of hydrocephalus. No effacement of the skull-base cisterns. No abnormal extra-axial fluid collections or blood products. Visualized paranasal sinuses and mastoid air cells are clear. The calvarium shows no significant abnormality.                                       Assessment/Plan:     Neuro  * Acute paralysis due to lesion of spinal cord  72 y.o. male with history of HTN, HLD, CAD on DAPT, tobacco abuse, lymphoma with extensive mets to the spine (untreated), chronic low back pain, and recent wedge compression fractures at T6 and T8 (2/2023) transferred from [facility] for neurosurgical evaluation of severe spinal cord stenosis and likely SCI s/p mechanical fall that occurred 2 days ago. [facility] for close monitoring  and a higher level of care.   - MRI Thoracic and Lumbar Spine showed moderate to severe spinal cord stenosis at multiple levels (T5-6, T8, & T10), mild pathologic compression fractures at multiple levels (T5, T6, T8, T10, and L1), and abnormal cord signal at T10.  - Patient refused admission and further workup, including, but not limited to, maintaining MAP goals > 85, imaging, and all lab work  - Patient repeatedly states that he wants to leave AMA and conveys his wishes to ED and neurosurgery teams as well.  - I expressed my concerns and educated the patient extensively on the risks of leaving. Patient expressed understanding and reports that he will take care of it later and follow up outpatient because he wants to be comfortable and go home.  - Neurocritical care will sign off at this time.           Activity Orders          Diet NPO: NPO starting at 05/21 0001        No Order     Level III    Christel Lozano PA-C  Neurocritical Care  [facility] - [facility] Dept

## 2023-05-21 NOTE — HPI
"Micheal Monroe is a 72 y.o.male with history of HTN, HLD, CAD on DAPT, tobacco abuse, lymphoma with extensive mets to the spine (untreated), chronic low back pain, and recent wedge compression fractures at T6 and T8 (2/2023) transferred from [facility] for neurosurgical evaluation of severe spinal cord stenosis and likely SCI s/p mechanical fall that occurred 2 days ago. Per chart review, the patient reported that his cat attacked him, which caused him to slip and fall. He denies any head trauma and LOC. Since then, he has complained of progressively worsening BLE numbness and weakness, which he describes as "my legs feel dead...I only feel pressure." He also reports constipation, which resolved after receiving an enema, as well as urinary retention, which required you placement by his home health nurse. The patient was brought to the ED at OSH via EMS, where his exam was reported as inconsistent. He initially refused his MRI, reporting that he would leave AMA and do it outpatient because he was in "too much pain," and if he went to MRI, "he will move all around." He later agreed to the imaging after receiving pain medication but later complained about being NPO. MRI Thoracic and Lumbar Spine showed moderate to severe spinal cord stenosis at multiple levels (T5-6, T8, & T10), mild pathologic compression fractures at multiple levels (T5, T6, T8, T10, and L1), and abnormal cord signal at T10. [place] [facility] [place] and possible intervention[place] [facility] for close monitoring and a higher level of care.     Currently, the patient states that he is miserable and wants to leave AMA. He reports that his girlfriend lives with him and has taken off the entire week to care for him. He stated that "maybe I'll just go hospice." I told the patient that we could give him medication for his pain and have social workers that could help facilitate the transition to hospice, " "but he again denied admission. He refused any further workup including, but not limited to, maintaining MAP goals > 85, further imaging, and all lab work. I expressed my concerns and the risks of leaving AMA.  I asked the patient if he would at least stay so that neurosurgery could evaluate him, to which he replied, "I'll take care of it later. I just want to go home. But not in that ambulance that brought me here. I want to leave with [facility]ri. They have cushions."    "

## 2023-05-21 NOTE — ED TRIAGE NOTES
"Micheal Monroe, an 72 y.o. male presents to the ED as transfer from [facility] for neurosurgery consult. Pt is unable to move BLE after trip and fall over cat a couple of days ago. Pt is incontinent of bowel and bladder and arrives with you in place.       Chief Complaint   Patient presents with    Fall     Pt to ED via EMS reporting fall 2 days ago after tripping over his cat. Pt c/o lower back pain, decrease in urination, and hasn't had a BM since. Pt reports he has a friend who is a nurse that placed a you this morning. Pt reports he began having severe leg pain but now states "my legs feel dead, I only feel pressure". Pt takes Plavix.      Review of patient's allergies indicates:   Allergen Reactions    Ciprofloxacin Other (See Comments)     "really dark thoughts, suicidal thoughts"    Levofloxacin Nausea And Vomiting and Other (See Comments)     Past Medical History:   Diagnosis Date    Acid reflux     Anticoagulant long-term use     PLAVIX and ASPIRIN    Coronary artery disease     had angiogram--pt refused STENT placement    Diverticula of intestine     Hematuria     Hypercholesteremia     Hypertension     Kidney stone       "

## 2023-06-07 ENCOUNTER — HOSPITAL ENCOUNTER (EMERGENCY)
Facility: HOSPITAL | Age: 73
Discharge: HOME | End: 2023-06-08
Attending: EMERGENCY MEDICINE
Payer: MEDICARE

## 2023-06-07 DIAGNOSIS — K92.2 GI BLEED: ICD-10-CM

## 2023-06-07 DIAGNOSIS — A41.9 SEPSIS, DUE TO UNSPECIFIED ORGANISM, UNSPECIFIED WHETHER ACUTE ORGAN DYSFUNCTION PRESENT: ICD-10-CM

## 2023-06-07 DIAGNOSIS — Z51.5 PALLIATIVE CARE ENCOUNTER: ICD-10-CM

## 2023-06-07 DIAGNOSIS — C79.9 METASTATIC MALIGNANT NEOPLASM, UNSPECIFIED SITE: Primary | ICD-10-CM

## 2023-06-07 LAB
ABO + RH BLD: NORMAL
ALBUMIN SERPL BCP-MCNC: 1.8 G/DL (ref 3.5–5.2)
ALLENS TEST: ABNORMAL
ALP SERPL-CCNC: 152 U/L (ref 55–135)
ALT SERPL W/O P-5'-P-CCNC: 106 U/L (ref 10–44)
ANION GAP SERPL CALC-SCNC: 12 MMOL/L (ref 8–16)
AST SERPL-CCNC: 120 U/L (ref 10–40)
BACTERIA #/AREA URNS HPF: ABNORMAL /HPF
BASOPHILS # BLD AUTO: 0.02 K/UL (ref 0–0.2)
BASOPHILS NFR BLD: 0.1 % (ref 0–1.9)
BILIRUB SERPL-MCNC: 0.3 MG/DL (ref 0.1–1)
BILIRUB UR QL STRIP: NEGATIVE
BLD GP AB SCN CELLS X3 SERPL QL: NORMAL
BLD PROD TYP BPU: NORMAL
BLOOD UNIT EXPIRATION DATE: NORMAL
BLOOD UNIT TYPE CODE: 5100
BLOOD UNIT TYPE: NORMAL
BUN SERPL-MCNC: 38 MG/DL (ref 8–23)
CALCIUM SERPL-MCNC: 9.5 MG/DL (ref 8.7–10.5)
CHLORIDE SERPL-SCNC: 98 MMOL/L (ref 95–110)
CLARITY UR: ABNORMAL
CO2 SERPL-SCNC: 23 MMOL/L (ref 23–29)
CODING SYSTEM: NORMAL
COLOR UR: YELLOW
CREAT SERPL-MCNC: 0.9 MG/DL (ref 0.5–1.4)
CROSSMATCH INTERPRETATION: NORMAL
DIFFERENTIAL METHOD: ABNORMAL
DISPENSE STATUS: NORMAL
EOSINOPHIL # BLD AUTO: 0.1 K/UL (ref 0–0.5)
EOSINOPHIL NFR BLD: 0.7 % (ref 0–8)
ERYTHROCYTE [DISTWIDTH] IN BLOOD BY AUTOMATED COUNT: 16.4 % (ref 11.5–14.5)
EST. GFR  (NO RACE VARIABLE): >60 ML/MIN/1.73 M^2
GLUCOSE SERPL-MCNC: 99 MG/DL (ref 70–110)
GLUCOSE UR QL STRIP: NEGATIVE
HCT VFR BLD AUTO: 24.5 % (ref 40–54)
HGB BLD-MCNC: 7.8 G/DL (ref 14–18)
HGB UR QL STRIP: ABNORMAL
HYALINE CASTS #/AREA URNS LPF: 3 /LPF
IMM GRANULOCYTES # BLD AUTO: 0.41 K/UL (ref 0–0.04)
IMM GRANULOCYTES NFR BLD AUTO: 2.6 % (ref 0–0.5)
KETONES UR QL STRIP: NEGATIVE
LACTATE SERPL-SCNC: 3.4 MMOL/L (ref 0.5–2.2)
LACTATE SERPL-SCNC: 3.9 MMOL/L (ref 0.5–2.2)
LDH SERPL L TO P-CCNC: 3.72 MMOL/L (ref 0.5–2.2)
LEUKOCYTE ESTERASE UR QL STRIP: ABNORMAL
LYMPHOCYTES # BLD AUTO: 1.2 K/UL (ref 1–4.8)
LYMPHOCYTES NFR BLD: 7.7 % (ref 18–48)
MAGNESIUM SERPL-MCNC: 1.8 MG/DL (ref 1.6–2.6)
MCH RBC QN AUTO: 25.2 PG (ref 27–31)
MCHC RBC AUTO-ENTMCNC: 31.8 G/DL (ref 32–36)
MCV RBC AUTO: 79 FL (ref 82–98)
MICROSCOPIC COMMENT: ABNORMAL
MONOCYTES # BLD AUTO: 1 K/UL (ref 0.3–1)
MONOCYTES NFR BLD: 6.5 % (ref 4–15)
NEUTROPHILS # BLD AUTO: 12.9 K/UL (ref 1.8–7.7)
NEUTROPHILS NFR BLD: 82.4 % (ref 38–73)
NITRITE UR QL STRIP: NEGATIVE
NRBC BLD-RTO: 0 /100 WBC
NUM UNITS TRANS PACKED RBC: NORMAL
PH UR STRIP: 6 [PH] (ref 5–8)
PHOSPHATE SERPL-MCNC: 4.2 MG/DL (ref 2.7–4.5)
PLATELET # BLD AUTO: 363 K/UL (ref 150–450)
PMV BLD AUTO: 9.6 FL (ref 9.2–12.9)
POTASSIUM SERPL-SCNC: 4.4 MMOL/L (ref 3.5–5.1)
PROCALCITONIN SERPL IA-MCNC: 0.25 NG/ML
PROT SERPL-MCNC: 5.9 G/DL (ref 6–8.4)
PROT UR QL STRIP: ABNORMAL
RBC # BLD AUTO: 3.09 M/UL (ref 4.6–6.2)
RBC #/AREA URNS HPF: 13 /HPF (ref 0–4)
SAMPLE: ABNORMAL
SITE: ABNORMAL
SODIUM SERPL-SCNC: 133 MMOL/L (ref 136–145)
SP GR UR STRIP: >1.03 (ref 1–1.03)
SPECIMEN OUTDATE: NORMAL
URATE CRY URNS QL MICRO: ABNORMAL
URN SPEC COLLECT METH UR: ABNORMAL
UROBILINOGEN UR STRIP-ACNC: NEGATIVE EU/DL
WBC # BLD AUTO: 15.69 K/UL (ref 3.9–12.7)
WBC #/AREA URNS HPF: 13 /HPF (ref 0–5)
WBC CLUMPS URNS QL MICRO: ABNORMAL

## 2023-06-07 PROCEDURE — 63600175 PHARM REV CODE 636 W HCPCS: Performed by: EMERGENCY MEDICINE

## 2023-06-07 PROCEDURE — 25000003 PHARM REV CODE 250: Performed by: EMERGENCY MEDICINE

## 2023-06-07 PROCEDURE — 85025 COMPLETE CBC W/AUTO DIFF WBC: CPT | Performed by: EMERGENCY MEDICINE

## 2023-06-07 PROCEDURE — 93005 ELECTROCARDIOGRAM TRACING: CPT

## 2023-06-07 PROCEDURE — 81000 URINALYSIS NONAUTO W/SCOPE: CPT | Performed by: EMERGENCY MEDICINE

## 2023-06-07 PROCEDURE — 84145 PROCALCITONIN (PCT): CPT | Performed by: EMERGENCY MEDICINE

## 2023-06-07 PROCEDURE — 83735 ASSAY OF MAGNESIUM: CPT | Performed by: EMERGENCY MEDICINE

## 2023-06-07 PROCEDURE — 99215 PR OFFICE/OUTPT VISIT, EST, LEVL V, 40-54 MIN: ICD-10-PCS | Mod: GW,,, | Performed by: NURSE PRACTITIONER

## 2023-06-07 PROCEDURE — 99900035 HC TECH TIME PER 15 MIN (STAT)

## 2023-06-07 PROCEDURE — 86900 BLOOD TYPING SEROLOGIC ABO: CPT | Performed by: EMERGENCY MEDICINE

## 2023-06-07 PROCEDURE — 87077 CULTURE AEROBIC IDENTIFY: CPT | Mod: 59 | Performed by: EMERGENCY MEDICINE

## 2023-06-07 PROCEDURE — 93010 EKG 12-LEAD: ICD-10-PCS | Mod: GW,,, | Performed by: INTERNAL MEDICINE

## 2023-06-07 PROCEDURE — 86920 COMPATIBILITY TEST SPIN: CPT | Performed by: EMERGENCY MEDICINE

## 2023-06-07 PROCEDURE — 99497 ADVNCD CARE PLAN 30 MIN: CPT | Mod: GW,,, | Performed by: NURSE PRACTITIONER

## 2023-06-07 PROCEDURE — 83605 ASSAY OF LACTIC ACID: CPT

## 2023-06-07 PROCEDURE — 87088 URINE BACTERIA CULTURE: CPT | Performed by: EMERGENCY MEDICINE

## 2023-06-07 PROCEDURE — 99285 EMERGENCY DEPT VISIT HI MDM: CPT | Mod: 25

## 2023-06-07 PROCEDURE — 96367 TX/PROPH/DG ADDL SEQ IV INF: CPT

## 2023-06-07 PROCEDURE — P9016 RBC LEUKOCYTES REDUCED: HCPCS | Performed by: EMERGENCY MEDICINE

## 2023-06-07 PROCEDURE — 87186 SC STD MICRODIL/AGAR DIL: CPT | Mod: 59 | Performed by: EMERGENCY MEDICINE

## 2023-06-07 PROCEDURE — 96361 HYDRATE IV INFUSION ADD-ON: CPT

## 2023-06-07 PROCEDURE — 96365 THER/PROPH/DIAG IV INF INIT: CPT

## 2023-06-07 PROCEDURE — 96375 TX/PRO/DX INJ NEW DRUG ADDON: CPT | Mod: 59

## 2023-06-07 PROCEDURE — 93010 ELECTROCARDIOGRAM REPORT: CPT | Mod: GW,,, | Performed by: INTERNAL MEDICINE

## 2023-06-07 PROCEDURE — 87040 BLOOD CULTURE FOR BACTERIA: CPT | Mod: 59 | Performed by: EMERGENCY MEDICINE

## 2023-06-07 PROCEDURE — 99215 OFFICE O/P EST HI 40 MIN: CPT | Mod: GW,,, | Performed by: NURSE PRACTITIONER

## 2023-06-07 PROCEDURE — 25500020 PHARM REV CODE 255: Performed by: EMERGENCY MEDICINE

## 2023-06-07 PROCEDURE — 99497 PR ADVNCD CARE PLAN 30 MIN: ICD-10-PCS | Mod: GW,,, | Performed by: NURSE PRACTITIONER

## 2023-06-07 PROCEDURE — 36430 TRANSFUSION BLD/BLD COMPNT: CPT

## 2023-06-07 PROCEDURE — 84100 ASSAY OF PHOSPHORUS: CPT | Performed by: EMERGENCY MEDICINE

## 2023-06-07 PROCEDURE — 87086 URINE CULTURE/COLONY COUNT: CPT | Performed by: EMERGENCY MEDICINE

## 2023-06-07 PROCEDURE — 83605 ASSAY OF LACTIC ACID: CPT | Mod: 91 | Performed by: EMERGENCY MEDICINE

## 2023-06-07 PROCEDURE — 96366 THER/PROPH/DIAG IV INF ADDON: CPT

## 2023-06-07 PROCEDURE — 80053 COMPREHEN METABOLIC PANEL: CPT | Performed by: EMERGENCY MEDICINE

## 2023-06-07 PROCEDURE — 87076 CULTURE ANAEROBE IDENT EACH: CPT | Performed by: EMERGENCY MEDICINE

## 2023-06-07 RX ORDER — MORPHINE SULFATE 4 MG/ML
4 INJECTION, SOLUTION INTRAMUSCULAR; INTRAVENOUS
Status: COMPLETED | OUTPATIENT
Start: 2023-06-07 | End: 2023-06-07

## 2023-06-07 RX ORDER — HYDROCODONE BITARTRATE AND ACETAMINOPHEN 500; 5 MG/1; MG/1
TABLET ORAL
Status: DISCONTINUED | OUTPATIENT
Start: 2023-06-07 | End: 2023-06-08 | Stop reason: HOSPADM

## 2023-06-07 RX ADMIN — MORPHINE SULFATE 4 MG: 4 INJECTION INTRAVENOUS at 08:06

## 2023-06-07 RX ADMIN — VANCOMYCIN HYDROCHLORIDE 1000 MG: 1 INJECTION, POWDER, LYOPHILIZED, FOR SOLUTION INTRAVENOUS at 03:06

## 2023-06-07 RX ADMIN — IOHEXOL 75 ML: 350 INJECTION, SOLUTION INTRAVENOUS at 02:06

## 2023-06-07 RX ADMIN — PIPERACILLIN AND TAZOBACTAM 4.5 G: 4; .5 INJECTION, POWDER, LYOPHILIZED, FOR SOLUTION INTRAVENOUS; PARENTERAL at 01:06

## 2023-06-07 RX ADMIN — MORPHINE SULFATE 4 MG: 4 INJECTION INTRAVENOUS at 01:06

## 2023-06-07 RX ADMIN — SODIUM CHLORIDE 1000 ML: 9 INJECTION, SOLUTION INTRAVENOUS at 01:06

## 2023-06-07 RX ADMIN — SODIUM CHLORIDE, POTASSIUM CHLORIDE, SODIUM LACTATE AND CALCIUM CHLORIDE 1674 ML: 600; 310; 30; 20 INJECTION, SOLUTION INTRAVENOUS at 01:06

## 2023-06-07 NOTE — SUBJECTIVE & OBJECTIVE
"    Past Medical History:   Diagnosis Date    Acid reflux     Anticoagulant long-term use     PLAVIX and ASPIRIN    Coronary artery disease     had angiogram--pt refused STENT placement    Diverticula of intestine     Hematuria     Hypercholesteremia     Hypertension     Kidney stone        Past Surgical History:   Procedure Laterality Date    EXTRACORPOREAL SHOCK WAVE LITHOTRIPSY      x 4 episodes in past    EYE SURGERY      removal of kidney stones      with nephrostomy tube in place for awhile       Review of patient's allergies indicates:   Allergen Reactions    Ciprofloxacin Other (See Comments)     "really dark thoughts, suicidal thoughts"    Levofloxacin Nausea And Vomiting and Other (See Comments)       Medications:  Continuous Infusions:  Scheduled Meds:   lactated ringers  30 mL/kg Intravenous ED 1 Time    piperacillin-tazobactam (Zosyn) IV (PEDS and ADULTS) (extended infusion is not appropriate)  4.5 g Intravenous ED 1 Time    sodium chloride 0.9%  1,000 mL Intravenous ED 1 Time    vancomycin (VANCOCIN) IVPB  20 mg/kg Intravenous Once     PRN Meds:sodium chloride, Pharmacy to dose Vancomycin consult **AND** vancomycin - pharmacy to dose    Family History    None       Tobacco Use    Smoking status: Every Day     Packs/day: 0.50     Years: 40.00     Pack years: 20.00     Types: Cigarettes    Smokeless tobacco: Never   Substance and Sexual Activity    Alcohol use: No    Drug use: No    Sexual activity: Yes       Review of Systems   Constitutional:  Positive for activity change.   Respiratory:  Positive for shortness of breath.    Gastrointestinal:  Positive for blood in stool.   Musculoskeletal:  Positive for myalgias, neck pain and neck stiffness.   Neurological:  Positive for weakness.   Objective:     Vital Signs (Most Recent):  Temp: 99.6 °F (37.6 °C) (06/07/23 1241)  Pulse: (!) 119 (06/07/23 1300)  Resp: 18 (06/07/23 1335)  BP: (!) 92/53 (06/07/23 1300)  SpO2: 98 % (06/07/23 1300) Vital Signs (24h " Range):  Temp:  [99.6 °F (37.6 °C)] 99.6 °F (37.6 °C)  Pulse:  [116-119] 119  Resp:  [12-29] 18  SpO2:  [98 %] 98 %  BP: (75-92)/(52-53) 92/53     Weight: 55.8 kg (123 lb)  Body mass index is 21.11 kg/m².       Physical Exam  Constitutional:       General: He is not in acute distress.     Appearance: He is ill-appearing. He is not toxic-appearing or diaphoretic.   HENT:      Head: Normocephalic and atraumatic.      Right Ear: External ear normal.      Left Ear: External ear normal.      Nose: Nose normal.      Mouth/Throat:      Mouth: Mucous membranes are dry.   Eyes:      General:         Right eye: No discharge.         Left eye: No discharge.   Cardiovascular:      Rate and Rhythm: Tachycardia present.   Pulmonary:      Comments: Increased work of breathing  Abdominal:      General: There is no distension.      Palpations: Abdomen is soft.      Tenderness: There is no abdominal tenderness.   Genitourinary:     Comments: Rectal bleeding  Musculoskeletal:      Cervical back: Rigidity present.      Right lower leg: No edema.      Left lower leg: No edema.      Comments: paraplegia   Skin:     General: Skin is warm and dry.      Coloration: Skin is pale.   Neurological:      Mental Status: He is alert and oriented to person, place, and time.      Motor: Weakness present.              Advance Care Planning   Advance Directives:   Goals of Care: The patient endorses that what is most important right now is to focus on quality of life, even if it means sacrificing a little time    Accordingly, we have decided that the best plan to meet the patient's goals includes palliative focused tx       Significant Labs: All pertinent labs within the past 24 hours have been reviewed.  CBC:   Recent Labs   Lab 06/07/23  1255   WBC 15.69*   HGB 7.8*   HCT 24.5*   MCV 79*        BMP:  Recent Labs   Lab 06/07/23  1255   GLU 99   *   K 4.4   CL 98   CO2 23   BUN 38*   CREATININE 0.9   CALCIUM 9.5     LFT:  Lab Results    Component Value Date     (H) 06/07/2023    ALKPHOS 152 (H) 06/07/2023    BILITOT 0.3 06/07/2023     Albumin:   Albumin   Date Value Ref Range Status   06/07/2023 1.8 (L) 3.5 - 5.2 g/dL Final     Protein:   Total Protein   Date Value Ref Range Status   06/07/2023 5.9 (L) 6.0 - 8.4 g/dL Final     Lactic acid:   No results found for: LACTATE    Significant Imaging: I have reviewed all pertinent imaging results/findings within the past 24 hours. Awaiting CTA abdomen
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
## 2023-06-07 NOTE — PLAN OF CARE
Consult addressed. MARCO met with patient to discussed hospice provider. Patient had no family member in the room with him. Patient advise MARCO to contact his girlfriend Naya Pedraza but he was not able to provide her phone number. MARCO called the emergency contact listed in chart no answer.     MARCO sent hospice referral to [facility] and [facility] via [employer].    7:15pm MARCO contacted [facility] to follow-up on the admission for patient. MARCO spoke the nurse she stated that Dino came to talk to the patient and to have him sign paper work. Patient did not have family member in the room with him and he was not capable to sign the consent. Dino tried to call family members no answer.

## 2023-06-07 NOTE — DISCHARGE INSTRUCTIONS
Further care to be provided by hospice.  Augmentin as directed.  Please return immediately if you get worse or if new problems develop.  You may reach out to your primary care doctor as needed.
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
## 2023-06-07 NOTE — PROGRESS NOTES
"Pharmacokinetic Initial Assessment: IV Vancomycin    Assessment/Plan:    Initiate intravenous vancomycin with loading dose of 1000 mg once followed by a maintenance dose of vancomycin 1000 mg IV every 24 hours  Desired empiric serum trough concentration is 10 to 20 mcg/mL  Draw vancomycin trough level 60 min prior to third dose on 6/9/23 at approximately 1430  Pharmacy will continue to follow and monitor vancomycin.      Please contact pharmacy at extension [phone] with any questions regarding this assessment.     Thank you for the consult,   Farheen Humphrey       Patient brief summary:  Micheal Monroe is a 72 y.o. male initiated on antimicrobial therapy with IV Vancomycin for treatment of suspected bacteremia    Drug Allergies:   Review of patient's allergies indicates:   Allergen Reactions    Ciprofloxacin Other (See Comments)     "really dark thoughts, suicidal thoughts"    Levofloxacin Nausea And Vomiting and Other (See Comments)       Actual Body Weight:   55.8 kg     Renal Function:   Estimated Creatinine Clearance: 58.6 mL/min (based on SCr of 0.9 mg/dL).,     Dialysis Method (if applicable):  N/A    CBC (last 72 hours):  Recent Labs   Lab Result Units 06/07/23  1255   WBC K/uL 15.69*   Hemoglobin g/dL 7.8*   Hematocrit % 24.5*   Platelets K/uL 363   Gran % % 82.4*   Lymph % % 7.7*   Mono % % 6.5   Eosinophil % % 0.7   Basophil % % 0.1   Differential Method  Automated       Metabolic Panel (last 72 hours):  Recent Labs   Lab Result Units 06/07/23  1255 06/07/23  1335 06/07/23  1512   Sodium mmol/L 133*  --   --    Potassium mmol/L 4.4  --   --    Chloride mmol/L 98  --   --    CO2 mmol/L 23  --   --    Glucose mg/dL 99  --   --    Glucose, UA   --   --  Negative   BUN mg/dL 38*  --   --    Creatinine mg/dL 0.9  --   --    Albumin g/dL 1.8*  --   --    Total Bilirubin mg/dL 0.3  --   --    Alkaline Phosphatase U/L 152*  --   --    AST U/L 120*  --   --    ALT U/L 106*  --   --    Magnesium mg/dL  --  1.8  --  "   Phosphorus mg/dL  --  4.2  --        Drug levels (last 3 results):  No results for input(s): VANCOMYCINRA, VANCORANDOM, VANCOMYCINPE, VANCOPEAK, VANCOMYCINTR, VANCOTROUGH in the last 72 hours.    Microbiologic Results:  Microbiology Results (last 7 days)       Procedure Component Value Units Date/Time    Urine culture [[id number]] Collected: 06/07/23 1512    Order Status: No result Specimen: Urine Updated: 06/07/23 1603    Blood culture x two cultures. Draw prior to antibiotics. [[id number]] Collected: 06/07/23 1337    Order Status: Sent Specimen: Blood from Peripheral, Antecubital, Right Updated: 06/07/23 1347    Blood culture x two cultures. Draw prior to antibiotics. [[id number]] Collected: 06/07/23 1338    Order Status: Sent Specimen: Blood from Peripheral, Forearm, Left Updated: 06/07/23 1340

## 2023-06-07 NOTE — ED NOTES
LOC: The patient is awake, alert, and oriented to self, place, time, and situation. Pt is calm and cooperative. Affect is appropriate.  Speech is appropriate and clear.     APPEARANCE: Patient resting comfortably in mild distress. Pt appears frail.     SKIN: The skin is cool and dry; pt appears pale. Patient has sluggish skin turgor and dry mucus membranes. Decubitus ulcer noted to sacrum.     MUSCULOSKELETAL: Patient weak moving upper extremities. Pt states he cannot move his lower extremities well for quite some time, reports pain in the extremities & back.  Reports weakness.     RESPIRATORY: Airway is open and patent. Respirations spontaneous, even, easy, and non-labored.  Patient has a normal effort and rate.  No accessory muscle use noted. Denies cough.     CARDIAC:  Increased rate noted at 120bpm.  No peripheral edema noted. No complaints of chest pain.     ABDOMEN: Soft and tender to palpation.  No distention noted. Pt reports abdominal pain & diarrhea; denies nausea, vomiting, or constipation.    NEUROLOGIC: Eyes open spontaneously. Follows commands; facial expression symmetrical.  Purposeful motor response noted; normal sensation in all extremities. Pt denies headache; reports lightheadedness or dizziness; denies visual disturbances.

## 2023-06-07 NOTE — HPI
Patient is a 72 year old male with non hodgkins lymphoma and mets spine who presents to ED after his s/o called 911 for rectal bleeding. He is currently on home hospice with [facility].

## 2023-06-08 VITALS
TEMPERATURE: 98 F | RESPIRATION RATE: 1 BRPM | BODY MASS INDEX: 21 KG/M2 | WEIGHT: 123 LBS | HEART RATE: 109 BPM | OXYGEN SATURATION: 99 % | DIASTOLIC BLOOD PRESSURE: 58 MMHG | SYSTOLIC BLOOD PRESSURE: 108 MMHG | HEIGHT: 64 IN

## 2023-06-08 PROBLEM — C79.9 METASTATIC MALIGNANT NEOPLASM: Status: ACTIVE | Noted: 2023-06-08

## 2023-06-08 PROBLEM — Z51.5 PALLIATIVE CARE ENCOUNTER: Status: ACTIVE | Noted: 2023-06-08

## 2023-06-08 PROCEDURE — 96376 TX/PRO/DX INJ SAME DRUG ADON: CPT

## 2023-06-08 PROCEDURE — 63600175 PHARM REV CODE 636 W HCPCS: Performed by: EMERGENCY MEDICINE

## 2023-06-08 PROCEDURE — 96361 HYDRATE IV INFUSION ADD-ON: CPT

## 2023-06-08 PROCEDURE — 25000003 PHARM REV CODE 250: Performed by: EMERGENCY MEDICINE

## 2023-06-08 PROCEDURE — 99214 OFFICE O/P EST MOD 30 MIN: CPT | Mod: ,,, | Performed by: NURSE PRACTITIONER

## 2023-06-08 PROCEDURE — 96365 THER/PROPH/DIAG IV INF INIT: CPT

## 2023-06-08 PROCEDURE — 99214 PR OFFICE/OUTPT VISIT, EST, LEVL IV, 30-39 MIN: ICD-10-PCS | Mod: ,,, | Performed by: NURSE PRACTITIONER

## 2023-06-08 RX ORDER — OXYMETAZOLINE HCL 0.05 %
2 SPRAY, NON-AEROSOL (ML) NASAL
Status: COMPLETED | OUTPATIENT
Start: 2023-06-08 | End: 2023-06-08

## 2023-06-08 RX ORDER — MORPHINE SULFATE 4 MG/ML
2 INJECTION, SOLUTION INTRAMUSCULAR; INTRAVENOUS EVERY 4 HOURS PRN
Status: DISCONTINUED | OUTPATIENT
Start: 2023-06-08 | End: 2023-06-08 | Stop reason: HOSPADM

## 2023-06-08 RX ORDER — AMOXICILLIN AND CLAVULANATE POTASSIUM 875; 125 MG/1; MG/1
1 TABLET, FILM COATED ORAL 2 TIMES DAILY
Qty: 14 TABLET | Refills: 0 | Status: SHIPPED | OUTPATIENT
Start: 2023-06-08

## 2023-06-08 RX ADMIN — MORPHINE SULFATE 2 MG: 4 INJECTION INTRAVENOUS at 04:06

## 2023-06-08 RX ADMIN — NASAL DECONGESTANT 2 SPRAY: 0.05 SPRAY NASAL at 12:06

## 2023-06-08 RX ADMIN — SODIUM CHLORIDE 1000 ML: 9 INJECTION, SOLUTION INTRAVENOUS at 12:06

## 2023-06-08 RX ADMIN — PIPERACILLIN AND TAZOBACTAM 4.5 G: 4; .5 INJECTION, POWDER, LYOPHILIZED, FOR SOLUTION INTRAVENOUS; PARENTERAL at 08:06

## 2023-06-08 RX ADMIN — MORPHINE SULFATE 2 MG: 4 INJECTION INTRAVENOUS at 05:06

## 2023-06-08 RX ADMIN — MORPHINE SULFATE 2 MG: 4 INJECTION INTRAVENOUS at 10:06

## 2023-06-08 NOTE — PROGRESS NOTES
Multiple attempts by hospice and myself to reach family last evening to transfer to in hospice. Will attempt again this am.

## 2023-06-08 NOTE — ED NOTES
Rounding on pt. Pt resting quietly, easily aroused to voice.  Pt states he feels like he is going under B/P 96/56 MD notified and has some discomfort in chest  ... IV fluids infusing at moment.

## 2023-06-08 NOTE — SUBJECTIVE & OBJECTIVE
Interval History: patient had comfortable night and remains in ED. awaiting transfer to inpatient hospice after family unable to be reached last pm    Medications:  Continuous Infusions:  Scheduled Meds:   piperacillin-tazobactam (Zosyn) IV (PEDS and ADULTS) (extended infusion is not appropriate)  4.5 g Intravenous Q8H    vancomycin (VANCOCIN) IVPB  1,000 mg Intravenous Q24H     PRN Meds:sodium chloride, morphine, Pharmacy to dose Vancomycin consult **AND** vancomycin - pharmacy to dose    Objective:     Vital Signs (Most Recent):  Temp: 98.3 °F (36.8 °C) (06/08/23 1303)  Pulse: 101 (06/08/23 1301)  Resp: (!) 24 (06/08/23 1301)  BP: (!) 117/57 (06/08/23 1301)  SpO2: 97 % (06/08/23 1301) Vital Signs (24h Range):  Temp:  [98.1 °F (36.7 °C)-99.6 °F (37.6 °C)] 98.3 °F (36.8 °C)  Pulse:  [] 101  Resp:  [16-24] 24  SpO2:  [95 %-100 %] 97 %  BP: ()/(48-63) 117/57     Weight: 55.8 kg (123 lb)  Body mass index is 21.11 kg/m².       Physical Exam  Constitutional:       General: He is not in acute distress.     Appearance: He is ill-appearing. He is not toxic-appearing or diaphoretic.   HENT:      Head: Normocephalic and atraumatic.      Right Ear: External ear normal.      Left Ear: External ear normal.      Nose: Nose normal.   Eyes:      General:         Right eye: No discharge.         Left eye: No discharge.   Cardiovascular:      Rate and Rhythm: Tachycardia present.   Pulmonary:      Comments: Increased work of breathing  Abdominal:      General: There is no distension.      Palpations: Abdomen is soft.      Tenderness: There is no abdominal tenderness.   Genitourinary:     Comments: Rectal bleeding  Musculoskeletal:      Cervical back: Rigidity present.      Right lower leg: No edema.      Left lower leg: No edema.      Comments: paraplegia   Skin:     General: Skin is warm and dry.      Coloration: Skin is pale.   Neurological:      Mental Status: He is alert and oriented to person, place, and time.       Motor: Weakness present.      Comments: Some periods of confusion   Psychiatric:         Mood and Affect: Affect is angry.         Behavior: Behavior is agitated.         Cognition and Memory: Memory is impaired.              Advance Care Planning   Advance Directives:   Goals of Care: The patient and family endorses that what is most important right now is to focus on comfort and QOL    Accordingly, we have decided that the best plan to meet the patient's goals includes resuming hospice       Significant Labs: All pertinent labs within the past 24 hours have been reviewed.  CBC:   Recent Labs   Lab 06/07/23  1255   WBC 15.69*   HGB 7.8*   HCT 24.5*   MCV 79*        BMP:  No results for input(s): GLU, NA, K, CL, CO2, BUN, CREATININE, CALCIUM, MG in the last 24 hours.  LFT:  Lab Results   Component Value Date     (H) 06/07/2023    ALKPHOS 152 (H) 06/07/2023    BILITOT 0.3 06/07/2023     Albumin:   Albumin   Date Value Ref Range Status   06/07/2023 1.8 (L) 3.5 - 5.2 g/dL Final     Protein:   Total Protein   Date Value Ref Range Status   06/07/2023 5.9 (L) 6.0 - 8.4 g/dL Final     Lactic acid:   Lab Results   Component Value Date    LACTATE 3.9 (HH) 06/07/2023    LACTATE 3.4 (H) 06/07/2023       Significant Imaging:  nothing new since yesterday
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
## 2023-06-08 NOTE — ED NOTES
Dr. Kaye informed of 1 out of 4 blood cultures laura + cocci in clusters. Verbal order to continue present treatment plan.

## 2023-06-08 NOTE — ED NOTES
Pt in room at moment denies any pain but also states he want to go home and has no reason to be here. Pt AAO x 4 at moment. MD notified.

## 2023-06-08 NOTE — ED NOTES
Pt left facility via EMS.. Pt AAO at moment.. /50 MD Zafar advised pt is able to leave due to hospice status. Alvarez cath in place.. Pt condition stable.

## 2023-06-08 NOTE — PLAN OF CARE
SW contacted [facility] to follow-up on the delivery of the hospital bed. Nurse stated that the bed is already delivered.     SW contacted patient's sister to obtain patient's fiance's phone number. Ms. Iverson confirmed that the bed is delivered to the house.     [facility] ordered transportation for patient.    ADT 30 order placed for [employer].  Requested  time: 2:58pm  If transportation does not arrive at ETA time nurse will be instructed to follow protocol for transportation below:  How can I get in touch directly with dispatch, if needed?                 Non-emergent dispatch: [phone]      +++NURSING:  If Van does not arrive at requested time please call the above Non Emergent Dispatcher.  If issue not resolved please escalate to your charge nurse for further instructions.

## 2023-06-08 NOTE — ED NOTES
Family at bedside with MD. Family updated on plan and advised that pt decision to leave or stay has top be followed up. Pt states he has no power of  and he can make his decision and right now he is ready to go home. Family at bedside pleading with pt to stay until hospital bed is delivered to home. Pt denies any pain at moment and still requesting to go home. MD will give pt and family members time to agree on pt disposition. Pt stable at moment.
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
## 2023-06-09 LAB — BACTERIA UR CULT: ABNORMAL

## 2023-06-10 LAB
BACTERIA BLD CULT: ABNORMAL

## 2023-06-11 LAB
BACTERIA BLD CULT: ABNORMAL

## 2024-06-19 DIAGNOSIS — I10 PRIMARY HYPERTENSION: ICD-10-CM
